# Patient Record
Sex: FEMALE | Race: WHITE | Employment: OTHER | URBAN - METROPOLITAN AREA
[De-identification: names, ages, dates, MRNs, and addresses within clinical notes are randomized per-mention and may not be internally consistent; named-entity substitution may affect disease eponyms.]

---

## 2017-01-05 ENCOUNTER — OFFICE VISIT (OUTPATIENT)
Dept: FAMILY MEDICINE CLINIC | Age: 62
End: 2017-01-05

## 2017-01-05 VITALS
SYSTOLIC BLOOD PRESSURE: 136 MMHG | BODY MASS INDEX: 32.09 KG/M2 | HEIGHT: 65 IN | RESPIRATION RATE: 12 BRPM | DIASTOLIC BLOOD PRESSURE: 80 MMHG | OXYGEN SATURATION: 98 % | HEART RATE: 87 BPM | WEIGHT: 192.6 LBS

## 2017-01-05 DIAGNOSIS — Z13.9 SCREENING: ICD-10-CM

## 2017-01-05 DIAGNOSIS — L98.9 NON-HEALING SKIN LESION: Primary | ICD-10-CM

## 2017-01-05 PROCEDURE — 11100 PR SHAV SKIN LES <5MM FACE,FACIAL: CPT | Performed by: NURSE PRACTITIONER

## 2017-01-05 PROCEDURE — 99213 OFFICE O/P EST LOW 20 MIN: CPT | Performed by: NURSE PRACTITIONER

## 2017-01-05 RX ORDER — VITAMIN B COMPLEX
1 TABLET ORAL DAILY
COMMUNITY
End: 2020-12-09 | Stop reason: ALTCHOICE

## 2017-01-05 ASSESSMENT — ENCOUNTER SYMPTOMS
ABDOMINAL PAIN: 0
COUGH: 0
NAUSEA: 0
SHORTNESS OF BREATH: 0

## 2017-01-05 ASSESSMENT — PATIENT HEALTH QUESTIONNAIRE - PHQ9
SUM OF ALL RESPONSES TO PHQ QUESTIONS 1-9: 0
2. FEELING DOWN, DEPRESSED OR HOPELESS: 0
SUM OF ALL RESPONSES TO PHQ9 QUESTIONS 1 & 2: 0
1. LITTLE INTEREST OR PLEASURE IN DOING THINGS: 0

## 2017-02-24 RX ORDER — MELOXICAM 15 MG/1
TABLET ORAL
Qty: 90 TABLET | Refills: 1 | Status: SHIPPED | OUTPATIENT
Start: 2017-02-24 | End: 2017-08-23 | Stop reason: SDUPTHER

## 2017-08-23 RX ORDER — MELOXICAM 15 MG/1
TABLET ORAL
Qty: 90 TABLET | Refills: 0 | Status: SHIPPED | OUTPATIENT
Start: 2017-08-23 | End: 2017-11-21 | Stop reason: SDUPTHER

## 2017-09-15 ENCOUNTER — TELEPHONE (OUTPATIENT)
Dept: FAMILY MEDICINE CLINIC | Age: 62
End: 2017-09-15

## 2017-09-15 DIAGNOSIS — M15.9 PRIMARY OSTEOARTHRITIS INVOLVING MULTIPLE JOINTS: Primary | ICD-10-CM

## 2017-09-15 RX ORDER — CYCLOBENZAPRINE HCL 10 MG
10 TABLET ORAL NIGHTLY PRN
Qty: 30 TABLET | Refills: 1 | Status: SHIPPED | OUTPATIENT
Start: 2017-09-15 | End: 2018-03-22 | Stop reason: SDUPTHER

## 2017-11-21 RX ORDER — MELOXICAM 15 MG/1
TABLET ORAL
Qty: 90 TABLET | Refills: 1 | Status: SHIPPED | OUTPATIENT
Start: 2017-11-21 | End: 2018-03-22 | Stop reason: SDUPTHER

## 2018-03-22 ENCOUNTER — OFFICE VISIT (OUTPATIENT)
Dept: FAMILY MEDICINE CLINIC | Age: 63
End: 2018-03-22
Payer: COMMERCIAL

## 2018-03-22 VITALS
HEART RATE: 76 BPM | DIASTOLIC BLOOD PRESSURE: 84 MMHG | BODY MASS INDEX: 29.89 KG/M2 | OXYGEN SATURATION: 97 % | HEIGHT: 66 IN | RESPIRATION RATE: 14 BRPM | WEIGHT: 186 LBS | SYSTOLIC BLOOD PRESSURE: 136 MMHG

## 2018-03-22 DIAGNOSIS — M15.9 PRIMARY OSTEOARTHRITIS INVOLVING MULTIPLE JOINTS: ICD-10-CM

## 2018-03-22 DIAGNOSIS — B00.9 HERPES SIMPLEX: Primary | ICD-10-CM

## 2018-03-22 DIAGNOSIS — B00.9 HERPES SIMPLEX: ICD-10-CM

## 2018-03-22 PROCEDURE — 99213 OFFICE O/P EST LOW 20 MIN: CPT | Performed by: FAMILY MEDICINE

## 2018-03-22 RX ORDER — VALACYCLOVIR HYDROCHLORIDE 1 G/1
1000 TABLET, FILM COATED ORAL 3 TIMES DAILY
Qty: 21 TABLET | Refills: 0 | Status: SHIPPED | OUTPATIENT
Start: 2018-03-22 | End: 2018-03-29

## 2018-03-22 RX ORDER — CYCLOBENZAPRINE HCL 10 MG
10 TABLET ORAL NIGHTLY PRN
Qty: 30 TABLET | Refills: 1 | Status: SHIPPED | OUTPATIENT
Start: 2018-03-22 | End: 2019-03-13 | Stop reason: SDUPTHER

## 2018-03-22 RX ORDER — MELOXICAM 15 MG/1
15 TABLET ORAL DAILY
Qty: 90 TABLET | Refills: 1 | Status: SHIPPED | OUTPATIENT
Start: 2018-03-22 | End: 2018-04-17 | Stop reason: SDUPTHER

## 2018-03-22 RX ORDER — MELOXICAM 15 MG/1
15 TABLET ORAL DAILY
Qty: 90 TABLET | Refills: 1 | Status: SHIPPED | OUTPATIENT
Start: 2018-03-22 | End: 2018-03-22 | Stop reason: SDUPTHER

## 2018-03-22 RX ORDER — VALACYCLOVIR HYDROCHLORIDE 1 G/1
1000 TABLET, FILM COATED ORAL 3 TIMES DAILY
Qty: 21 TABLET | Refills: 0 | Status: SHIPPED | OUTPATIENT
Start: 2018-03-22 | End: 2018-03-22 | Stop reason: SDUPTHER

## 2018-03-22 ASSESSMENT — PATIENT HEALTH QUESTIONNAIRE - PHQ9
SUM OF ALL RESPONSES TO PHQ QUESTIONS 1-9: 0
2. FEELING DOWN, DEPRESSED OR HOPELESS: 0
1. LITTLE INTEREST OR PLEASURE IN DOING THINGS: 0
SUM OF ALL RESPONSES TO PHQ9 QUESTIONS 1 & 2: 0

## 2018-03-22 ASSESSMENT — ENCOUNTER SYMPTOMS
GASTROINTESTINAL NEGATIVE: 1
RESPIRATORY NEGATIVE: 1

## 2018-03-22 NOTE — PROGRESS NOTES
Visit Information    Have you changed or started any medications since your last visit including any over-the-counter medicines, vitamins, or herbal medicines? no   Have you stopped taking any of your medications? Is so, why? -  no  Are you having any side effects from any of your medications? - no    Have you seen any other physician or provider since your last visit? yes - Dr Aaron Ybarra office   Have you had any other diagnostic tests since your last visit? yes -  Pelvic, pap,    Have you been seen in the emergency room and/or had an admission in a hospital since we last saw you?  no   Have you had your routine dental cleaning in the past 6 months?  yes - Dr Avendano Felt     Do you have an active MyChart account? If no, what is the barrier?   No:  Pt refused    Patient Care Team:  Robe Brito MD as PCP - General (Family Medicine)    Medical History Review  Past Medical, Family, and Social History reviewed and does not contribute to the patient presenting condition    Health Maintenance   Topic Date Due    Hepatitis C screen  1955    HIV screen  11/27/1970    DTaP/Tdap/Td vaccine (1 - Tdap) 11/27/1974    Lipid screen  11/27/1995    Diabetes screen  11/27/1995    Shingles Vaccine (1 of 2 - 2 Dose Series) 11/27/2005    Breast cancer screen  08/26/2017    Flu vaccine (1) 09/01/2017    Cervical cancer screen  03/17/2018    Colon cancer screen colonoscopy  03/22/2023

## 2018-03-22 NOTE — TELEPHONE ENCOUNTER
Please resend Rxs for Valtrex and Mobic to [facility]. They were sent to [facility].

## 2018-03-22 NOTE — PROGRESS NOTES
Subjective:      Patient ID: Nataliya Alejandro is a 58 y.o. female. HPI:    Chief Complaint   Patient presents with    Annual Exam     med refill  reill for med to tx rash on buttock given by Dr Jm Ortega     Annual eval.      Pt has rash on buttocks for 3 days. Pain with scratching, does itch at times. Has had this before. Hx of OA and LBP that comes and goes. Would like refill of Mobic and Flexeril. Patient Active Problem List   Diagnosis    Sinusitis chronic, frontal    Herpes simplex, skin of buttocks    Osteoarthrosis involving multiple sites, rt knee, fingers/hands    Acute bacterial conjunctivitis     Past Surgical History:   Procedure Laterality Date    CERVIX LESION DESTRUCTION       SECTION      ENDOMETRIAL ABLATION      REFRACTIVE SURGERY       Prior to Admission medications    Medication Sig Start Date End Date Taking? Authorizing Provider   meloxicam (MOBIC) 15 MG tablet TAKE 1 TABLET DAILY 17  Yes Mariajose Washington MD   cyclobenzaprine (FLEXERIL) 10 MG tablet Take 1 tablet by mouth nightly as needed for Muscle spasms 9/15/17 9/15/18 Yes Mariajose Washington MD   B Complex Vitamins (B-COMPLEX/B-12) TABS Take 1 tablet by mouth daily   Yes Historical Provider, MD   ibuprofen (ADVIL;MOTRIN) 400 MG tablet Take 400 mg by mouth as needed for Pain. Yes Historical Provider, MD   Multiple Vitamins-Minerals (MULTIPLE VITAMINS/WOMENS) TABS Take 1 tablet by mouth daily. Yes Historical Provider, MD   OLIVE LEAF PO Take 1 capsule by mouth daily. Yes Historical Provider, MD   oxybutynin (DITROPAN XL) 10 MG CR tablet Take 10 mg by mouth daily. Yes Historical Provider, MD         Review of Systems   Constitutional: Negative. HENT: Negative. Respiratory: Negative. Cardiovascular: Negative. Gastrointestinal: Negative. Musculoskeletal: Positive for arthralgias. Skin: Positive for rash (right buttock). All other systems reviewed and are negative.       Objective:   Physical

## 2018-04-17 DIAGNOSIS — M15.9 PRIMARY OSTEOARTHRITIS INVOLVING MULTIPLE JOINTS: ICD-10-CM

## 2018-04-18 RX ORDER — MELOXICAM 15 MG/1
TABLET ORAL
Qty: 90 TABLET | Refills: 1 | Status: SHIPPED | OUTPATIENT
Start: 2018-04-18 | End: 2019-03-13 | Stop reason: SDUPTHER

## 2019-03-13 ENCOUNTER — OFFICE VISIT (OUTPATIENT)
Dept: FAMILY MEDICINE CLINIC | Age: 64
End: 2019-03-13
Payer: COMMERCIAL

## 2019-03-13 VITALS
RESPIRATION RATE: 20 BRPM | DIASTOLIC BLOOD PRESSURE: 82 MMHG | OXYGEN SATURATION: 96 % | SYSTOLIC BLOOD PRESSURE: 124 MMHG | WEIGHT: 199.7 LBS | BODY MASS INDEX: 32.73 KG/M2 | HEART RATE: 88 BPM

## 2019-03-13 DIAGNOSIS — F51.01 PRIMARY INSOMNIA: Primary | ICD-10-CM

## 2019-03-13 DIAGNOSIS — Z12.31 ENCOUNTER FOR SCREENING MAMMOGRAM FOR BREAST CANCER: ICD-10-CM

## 2019-03-13 DIAGNOSIS — B00.9 HERPES SIMPLEX: ICD-10-CM

## 2019-03-13 DIAGNOSIS — M15.9 PRIMARY OSTEOARTHRITIS INVOLVING MULTIPLE JOINTS: ICD-10-CM

## 2019-03-13 PROCEDURE — 99214 OFFICE O/P EST MOD 30 MIN: CPT | Performed by: FAMILY MEDICINE

## 2019-03-13 RX ORDER — CYCLOBENZAPRINE HCL 10 MG
10 TABLET ORAL NIGHTLY PRN
Qty: 30 TABLET | Refills: 1 | Status: SHIPPED | OUTPATIENT
Start: 2019-03-13 | End: 2021-01-25 | Stop reason: SDUPTHER

## 2019-03-13 RX ORDER — ZOLPIDEM TARTRATE 5 MG/1
5 TABLET ORAL NIGHTLY PRN
Qty: 30 TABLET | Refills: 0 | Status: SHIPPED | OUTPATIENT
Start: 2019-03-13 | End: 2019-05-21 | Stop reason: SDUPTHER

## 2019-03-13 RX ORDER — VALACYCLOVIR HYDROCHLORIDE 1 G/1
1000 TABLET, FILM COATED ORAL 3 TIMES DAILY
Qty: 21 TABLET | Refills: 0 | Status: SHIPPED | OUTPATIENT
Start: 2019-03-13 | End: 2019-03-20

## 2019-03-13 RX ORDER — CALCIUM CARBONATE 500(1250)
500 TABLET ORAL DAILY
COMMUNITY
End: 2020-12-09

## 2019-03-13 RX ORDER — MAGNESIUM 30 MG
30 TABLET ORAL 2 TIMES DAILY
COMMUNITY

## 2019-03-13 RX ORDER — MELOXICAM 15 MG/1
TABLET ORAL
Qty: 90 TABLET | Refills: 1 | Status: SHIPPED | OUTPATIENT
Start: 2019-03-13 | End: 2019-03-30 | Stop reason: SDUPTHER

## 2019-03-13 ASSESSMENT — ENCOUNTER SYMPTOMS
GASTROINTESTINAL NEGATIVE: 1
RESPIRATORY NEGATIVE: 1

## 2019-03-13 ASSESSMENT — PATIENT HEALTH QUESTIONNAIRE - PHQ9
SUM OF ALL RESPONSES TO PHQ QUESTIONS 1-9: 0
SUM OF ALL RESPONSES TO PHQ9 QUESTIONS 1 & 2: 0
2. FEELING DOWN, DEPRESSED OR HOPELESS: 0
SUM OF ALL RESPONSES TO PHQ QUESTIONS 1-9: 0
1. LITTLE INTEREST OR PLEASURE IN DOING THINGS: 0

## 2019-03-30 DIAGNOSIS — M15.9 PRIMARY OSTEOARTHRITIS INVOLVING MULTIPLE JOINTS: ICD-10-CM

## 2019-04-01 RX ORDER — MELOXICAM 15 MG/1
TABLET ORAL
Qty: 90 TABLET | Refills: 1 | Status: SHIPPED | OUTPATIENT
Start: 2019-04-01 | End: 2019-11-21 | Stop reason: SDUPTHER

## 2019-05-06 ENCOUNTER — HOSPITAL ENCOUNTER (OUTPATIENT)
Age: 64
Discharge: HOME | End: 2019-05-06
Payer: COMMERCIAL

## 2019-05-06 LAB
CALCIUM SERPL-MCNC: 9.6 MG/DL (ref 8.5–10.5)
VITAMIN D 25-HYDROXY: 44 NG/ML (ref 30–100)

## 2019-05-06 PROCEDURE — 36415 COLL VENOUS BLD VENIPUNCTURE: CPT

## 2019-05-06 PROCEDURE — 82310 ASSAY OF CALCIUM: CPT

## 2019-05-06 PROCEDURE — 82306 VITAMIN D 25 HYDROXY: CPT

## 2019-05-21 DIAGNOSIS — F51.01 PRIMARY INSOMNIA: ICD-10-CM

## 2019-05-21 RX ORDER — ZOLPIDEM TARTRATE 5 MG/1
5 TABLET ORAL NIGHTLY PRN
Qty: 30 TABLET | Refills: 0 | Status: SHIPPED | OUTPATIENT
Start: 2019-05-21 | End: 2019-08-27 | Stop reason: SDUPTHER

## 2019-05-21 NOTE — TELEPHONE ENCOUNTER
Anita Steve called requesting a refill on the following medications:  Requested Prescriptions     Pending Prescriptions Disp Refills    zolpidem (AMBIEN) 5 MG tablet 30 tablet 0     Sig: Take 1 tablet by mouth nightly as needed for Sleep for up to 30 days.      Pharmacy verified:  [facility]      Date of last visit: 3/13/19   Date of next visit (if applicable):

## 2019-08-27 DIAGNOSIS — F51.01 PRIMARY INSOMNIA: ICD-10-CM

## 2019-08-27 RX ORDER — ZOLPIDEM TARTRATE 5 MG/1
5 TABLET ORAL NIGHTLY PRN
Qty: 30 TABLET | Refills: 0 | Status: SHIPPED | OUTPATIENT
Start: 2019-08-27 | End: 2019-10-28 | Stop reason: SDUPTHER

## 2019-09-16 RX ORDER — SOLIFENACIN SUCCINATE 5 MG/1
TABLET, FILM COATED ORAL
COMMUNITY
Start: 2019-09-09 | End: 2019-10-28

## 2019-10-28 ENCOUNTER — OFFICE VISIT (OUTPATIENT)
Dept: FAMILY MEDICINE CLINIC | Age: 64
End: 2019-10-28
Payer: COMMERCIAL

## 2019-10-28 VITALS
WEIGHT: 197 LBS | HEIGHT: 65 IN | OXYGEN SATURATION: 98 % | DIASTOLIC BLOOD PRESSURE: 68 MMHG | SYSTOLIC BLOOD PRESSURE: 128 MMHG | BODY MASS INDEX: 32.82 KG/M2 | RESPIRATION RATE: 14 BRPM | HEART RATE: 78 BPM

## 2019-10-28 DIAGNOSIS — F51.01 PRIMARY INSOMNIA: Primary | ICD-10-CM

## 2019-10-28 DIAGNOSIS — Z23 NEEDS FLU SHOT: ICD-10-CM

## 2019-10-28 DIAGNOSIS — M15.9 PRIMARY OSTEOARTHRITIS INVOLVING MULTIPLE JOINTS: ICD-10-CM

## 2019-10-28 PROCEDURE — 90686 IIV4 VACC NO PRSV 0.5 ML IM: CPT | Performed by: FAMILY MEDICINE

## 2019-10-28 PROCEDURE — 99214 OFFICE O/P EST MOD 30 MIN: CPT | Performed by: FAMILY MEDICINE

## 2019-10-28 PROCEDURE — 90471 IMMUNIZATION ADMIN: CPT | Performed by: FAMILY MEDICINE

## 2019-10-28 RX ORDER — ZOLPIDEM TARTRATE 5 MG/1
5 TABLET ORAL NIGHTLY PRN
Qty: 90 TABLET | Refills: 1 | Status: SHIPPED | OUTPATIENT
Start: 2019-10-28 | End: 2021-06-09 | Stop reason: SDUPTHER

## 2019-10-28 ASSESSMENT — ENCOUNTER SYMPTOMS
RESPIRATORY NEGATIVE: 1
GASTROINTESTINAL NEGATIVE: 1

## 2019-11-21 DIAGNOSIS — M15.9 PRIMARY OSTEOARTHRITIS INVOLVING MULTIPLE JOINTS: ICD-10-CM

## 2019-11-21 RX ORDER — MELOXICAM 15 MG/1
15 TABLET ORAL DAILY
Qty: 90 TABLET | Refills: 1 | Status: SHIPPED | OUTPATIENT
Start: 2019-11-21 | End: 2019-11-22 | Stop reason: SDUPTHER

## 2019-11-22 DIAGNOSIS — M15.9 PRIMARY OSTEOARTHRITIS INVOLVING MULTIPLE JOINTS: ICD-10-CM

## 2019-11-22 RX ORDER — MELOXICAM 15 MG/1
TABLET ORAL
Qty: 90 TABLET | Refills: 4 | Status: SHIPPED | OUTPATIENT
Start: 2019-11-22 | End: 2021-02-19 | Stop reason: SDUPTHER

## 2020-06-23 ENCOUNTER — VIRTUAL VISIT (OUTPATIENT)
Dept: FAMILY MEDICINE CLINIC | Age: 65
End: 2020-06-23
Payer: COMMERCIAL

## 2020-06-23 PROCEDURE — 99214 OFFICE O/P EST MOD 30 MIN: CPT | Performed by: FAMILY MEDICINE

## 2020-06-23 ASSESSMENT — ENCOUNTER SYMPTOMS
GASTROINTESTINAL NEGATIVE: 1
RESPIRATORY NEGATIVE: 1

## 2020-06-23 NOTE — PROGRESS NOTES
2020    TELEHEALTH EVALUATION -- Audio/Visual (During CFOWZ-21 public health emergency)    HPI:    Rory Lowe (:  1955) has requested an audio/video evaluation for the following concern(s):    Encounter Diagnoses   Name Primary?  Primary osteoarthritis involving multiple joints Yes    Primary insomnia     OAB (overactive bladder)      Patient's arthritis continues to be controlled with her daily dose of meloxicam 15 mg. She is able to walk distances up to a mile without discomfort in her weightbearing joints. She also uses over-the-counter CBD oil both topically and internally which gives her additional pain relief. Both hands do become painful mostly aching, after repetitive use of her fingers like knitting. Her insomnia is an ongoing issue. She had worked up to 10 mg of melatonin without much response so her gynecologist has given her zolpidem 5 mg nightly which is helpful. She continues to have overactive bladder symptoms and this had been treated with Ditropan initially but that was stopped because of dry mouth side effects. She was then switched to [facility] but felt that the dry mouth associated with it was not outweighed by any improved bladder control so she stopped that also. She is going to try and increase her walking distance and work up to 2 miles over the summer. Otherwise she is doing well. The rest of this patient's conditions are stable. Past medical and surgical hx reviewed. No past medical history on file. Past Surgical History:   Procedure Laterality Date    CERVIX LESION DESTRUCTION       SECTION      ENDOMETRIAL ABLATION      REFRACTIVE SURGERY       Portions of this note were completed with a voice recording program.  Efforts were made to edit the dictations but occasionally words are mis-transcribed. Review of Systems   Constitutional: Negative. HENT: Negative. Respiratory: Negative. Cardiovascular: Negative.

## 2020-08-19 ENCOUNTER — TELEPHONE (OUTPATIENT)
Dept: FAMILY MEDICINE CLINIC | Age: 65
End: 2020-08-19

## 2020-08-19 NOTE — TELEPHONE ENCOUNTER
Patient stopped by office and requesting to have B/P checked and compared to her home B/P machine. B/P checked manually 140/80. B/P on machine 154/92. Patient also brought in home readings--to be scanned in chart for your review.   Please advise

## 2020-08-19 NOTE — TELEPHONE ENCOUNTER
She needs to buy a new blood pressure unit. Would recommend her looking for [employer] brand with a arm cuff. With a new unit, have her take 3 readings at least 2 minutes apart than average those readings and record them. However also track pulse rates at the same time. Call those results back into the office or drop off a record of the blood pressures in 2 weeks.

## 2020-09-04 NOTE — TELEPHONE ENCOUNTER
Continue tracking blood pressures at home. At this point, using a low-dose blood pressure medication would be warranted to try to limit the high readings from occurring. See if she is willing to try something.

## 2020-09-04 NOTE — TELEPHONE ENCOUNTER
Patient calls with her BP readings. Most of these were taken in the morning, 1 was at 4 pm, but the highest BP was at 10 pm.    8/19/20 143/79  76  8/20/20 132/80  70  8/21/20 155/82  83  8/22/20 126/81  86  8/23/20 128/82  94  8/24/20 136/90  96  8/25/20 142/81  90  8/26/20 132/76  77  8/27/20 127/81  75  8/29/20 129/86  81  8/30/20 169/86 (10pm) 73  8/31/20 138/75  79      Please advise.

## 2020-09-08 RX ORDER — LISINOPRIL 10 MG/1
10 TABLET ORAL DAILY
Qty: 30 TABLET | Refills: 2 | Status: SHIPPED | OUTPATIENT
Start: 2020-09-08 | End: 2020-09-23

## 2020-09-08 NOTE — TELEPHONE ENCOUNTER
Prescription sent for lisinopril 10 mg daily to her local pharmacy. Obviously keep tracking home blood pressures for us.

## 2020-09-08 NOTE — TELEPHONE ENCOUNTER
The patient returned the call and was updated. She is willing to try a low dose BP medication but is concerned with side effects. Florina Drop to cont to track her BP and call with her readings in 2 weeks. She is to contact the office is she starts experiencing and issues. The patient uses [facility]. Please advise. If no call back she will check with her pharmacy after 2pm today.

## 2020-09-22 ENCOUNTER — TELEPHONE (OUTPATIENT)
Dept: FAMILY MEDICINE CLINIC | Age: 65
End: 2020-09-22

## 2020-09-22 NOTE — TELEPHONE ENCOUNTER
Spoke with pt in-office appointment scheduled for tomorrow 9/23/2020 8:00 am with Nichole Hannah CNP.

## 2020-09-22 NOTE — TELEPHONE ENCOUNTER
Medication, Lisinopril, was filled on 9/9/20.    9/9   8:30 am 135/75   1230  135/86    9/10 11:20am 147/90   1:40pm 139/80    9/12 4:oo pm 158/81    9/13 8:30 am 131/82    9/14 3:30 pm 179/87    9/16 3:00 pm 162/81    9/17 5:30 pm 154/85 (left) 165/86 (right)      She does not feel the Lisinopril is not really working to control her BP. She said there were a couple day she took Tylenol and wondered if that made it go up a little? She is asking if she needs another appointment or what Dr Luis Guzman wants to do? Please advise.     DOLJEANINE  6/23/20  DONJEANINE  12/9/20    [facility]

## 2020-09-23 ENCOUNTER — OFFICE VISIT (OUTPATIENT)
Dept: FAMILY MEDICINE CLINIC | Age: 65
End: 2020-09-23
Payer: COMMERCIAL

## 2020-09-23 VITALS
HEART RATE: 76 BPM | SYSTOLIC BLOOD PRESSURE: 130 MMHG | DIASTOLIC BLOOD PRESSURE: 68 MMHG | BODY MASS INDEX: 31.76 KG/M2 | TEMPERATURE: 97.1 F | WEIGHT: 188.3 LBS | RESPIRATION RATE: 20 BRPM

## 2020-09-23 PROCEDURE — 99213 OFFICE O/P EST LOW 20 MIN: CPT | Performed by: NURSE PRACTITIONER

## 2020-09-23 SDOH — ECONOMIC STABILITY: INCOME INSECURITY: HOW HARD IS IT FOR YOU TO PAY FOR THE VERY BASICS LIKE FOOD, HOUSING, MEDICAL CARE, AND HEATING?: NOT HARD AT ALL

## 2020-09-23 SDOH — ECONOMIC STABILITY: FOOD INSECURITY: WITHIN THE PAST 12 MONTHS, THE FOOD YOU BOUGHT JUST DIDN'T LAST AND YOU DIDN'T HAVE MONEY TO GET MORE.: NEVER TRUE

## 2020-09-23 SDOH — ECONOMIC STABILITY: TRANSPORTATION INSECURITY
IN THE PAST 12 MONTHS, HAS THE LACK OF TRANSPORTATION KEPT YOU FROM MEDICAL APPOINTMENTS OR FROM GETTING MEDICATIONS?: NO

## 2020-09-23 SDOH — ECONOMIC STABILITY: TRANSPORTATION INSECURITY
IN THE PAST 12 MONTHS, HAS LACK OF TRANSPORTATION KEPT YOU FROM MEETINGS, WORK, OR FROM GETTING THINGS NEEDED FOR DAILY LIVING?: NO

## 2020-09-23 SDOH — ECONOMIC STABILITY: FOOD INSECURITY: WITHIN THE PAST 12 MONTHS, YOU WORRIED THAT YOUR FOOD WOULD RUN OUT BEFORE YOU GOT MONEY TO BUY MORE.: NEVER TRUE

## 2020-09-23 ASSESSMENT — PATIENT HEALTH QUESTIONNAIRE - PHQ9
SUM OF ALL RESPONSES TO PHQ QUESTIONS 1-9: 0
1. LITTLE INTEREST OR PLEASURE IN DOING THINGS: 0
2. FEELING DOWN, DEPRESSED OR HOPELESS: 0
SUM OF ALL RESPONSES TO PHQ QUESTIONS 1-9: 0
SUM OF ALL RESPONSES TO PHQ9 QUESTIONS 1 & 2: 0

## 2020-09-23 ASSESSMENT — ENCOUNTER SYMPTOMS
COUGH: 0
NAUSEA: 0
SHORTNESS OF BREATH: 0
ABDOMINAL PAIN: 0

## 2020-09-23 NOTE — PROGRESS NOTES
Chronic Disease Visit Information    BP Readings from Last 3 Encounters:   10/28/19 128/68   03/13/19 124/82   03/22/18 136/84          No results found for: LABA1C, LABMICR, LDLCHOLESTEROL, LDLCALC, HDL, BUN, CREATININE, GLUCOSE         Have you changed or started any medications since your last visit including any over-the-counter medicines, vitamins, or herbal medicines? no   Are you having any side effects from any of your medications? -  no  Have you stopped taking any of your medications? Is so, why? -  yes - pt choice    Have you seen any other physician or provider since your last visit? No  Have you had any other diagnostic tests since your last visit? No  Have you been seen in the emergency room and/or had an admission to a hospital since we last saw you? No  Have you had your annual diabetic retinal (eye) exam? No  Have you had your routine dental cleaning in the past 6 months? yes - 2wks ago    Have you activated your [employer] account? If not, what are your barriers?  No: pt choice     Patient Care Team:  Christiano Felix MD as PCP - General (Family Medicine)  Christiano Felix MD [facility] PCP [facility]         Medical History Review  Past Medical, Family, and Social History reviewed and does contribute to the patient presenting condition    Health Maintenance   Topic Date Due    Potassium monitoring  1955    Creatinine monitoring  1955    Hepatitis C screen  1955    HIV screen  11/27/1970    DTaP/Tdap/Td vaccine (1 - Tdap) 11/27/1974    Lipid screen  11/27/1995    Diabetes screen  11/27/1995    Shingles Vaccine (1 of 2) 11/27/2005    Cervical cancer screen  03/17/2018    Breast cancer screen  03/13/2020    Flu vaccine (1) 09/01/2020    Colon cancer screen colonoscopy  03/26/2028    Hepatitis A vaccine  Aged Out    Hepatitis B vaccine  Aged Out    Hib vaccine  Aged Out    Meningococcal (ACWY) vaccine  Aged Out    Pneumococcal 0-64 years Vaccine  Aged Out
continue to monitor  Stay active, weight loss  Call with update on BP readings        per Klinefelter, APRN - [facility]

## 2020-10-23 ENCOUNTER — TELEPHONE (OUTPATIENT)
Dept: FAMILY MEDICINE CLINIC | Age: 65
End: 2020-10-23

## 2020-10-23 RX ORDER — CEPHALEXIN 500 MG/1
500 CAPSULE ORAL 3 TIMES DAILY
Qty: 15 CAPSULE | Refills: 0 | Status: SHIPPED | OUTPATIENT
Start: 2020-10-23 | End: 2020-10-28

## 2020-10-23 NOTE — Clinical Note
Prescription for Keflex sent to her pharmacy. She is listed as having a penicillin allergy but there is no indication of reaction. Even if she is allergic to penicillin, the chances of a reaction to cephalosporin is less than 5%. In this case, the benefit outweighs the risk. If she should have reaction, she needs to contact the office or be seen in the urgent care.

## 2020-10-23 NOTE — TELEPHONE ENCOUNTER
Patient left voice mail message that she smashed her finger 1 week ago and now believes it is infected. Requesting ATB.   Please advise

## 2020-12-09 ENCOUNTER — OFFICE VISIT (OUTPATIENT)
Dept: FAMILY MEDICINE CLINIC | Age: 65
End: 2020-12-09
Payer: MEDICARE

## 2020-12-09 VITALS
TEMPERATURE: 97.2 F | OXYGEN SATURATION: 98 % | BODY MASS INDEX: 32.39 KG/M2 | HEIGHT: 65 IN | DIASTOLIC BLOOD PRESSURE: 82 MMHG | WEIGHT: 194.4 LBS | SYSTOLIC BLOOD PRESSURE: 138 MMHG | HEART RATE: 84 BPM | RESPIRATION RATE: 16 BRPM

## 2020-12-09 PROCEDURE — 99214 OFFICE O/P EST MOD 30 MIN: CPT | Performed by: FAMILY MEDICINE

## 2020-12-09 ASSESSMENT — ENCOUNTER SYMPTOMS
ALLERGIC/IMMUNOLOGIC NEGATIVE: 1
RESPIRATORY NEGATIVE: 1
GASTROINTESTINAL NEGATIVE: 1

## 2020-12-09 NOTE — PROGRESS NOTES
Subjective:      Patient ID: Cristine Tran is a 72 y.o. female. HPI  Follow up of chronic conditions. Encounter Diagnoses   Name Primary?  Essential hypertension Yes    Primary insomnia     OAB (overactive bladder)     Primary osteoarthritis involving multiple joints     Medication monitoring encounter      HTN is stable without medications. She continues to control her blood pressures by exercise and weight maintenance with dietary changes currently. BP Readings from Last 3 Encounters:   20 138/82   20 130/68   10/28/19 128/68     Her weight is still below what it was a year ago but has gone up from this summer by 6 pounds. She wants to continue controlling this so that she does not have to go back on hypertensive medications. Wt Readings from Last 3 Encounters:   20 194 lb 6.4 oz (88.2 kg)   20 188 lb 4.8 oz (85.4 kg)   10/28/19 197 lb (89.4 kg)   Body mass index is 32.35 kg/m². She continues to use meloxicam as needed but mostly relies on topical CBD oil on joints that hurt. She is not using any over-the-counter supplements either for joint pain. Overactive bladder symptoms are still intermittently present. She was on Vesicare but stopped it due to dry mouth. The fact any other med will have similar side effects was discussed with her so she elects at this point to use no meds. She continues to wear her peripad consequently. The rest of this patient's conditions are stable. Past medical and surgical hx reviewed. No past medical history on file. Past Surgical History:   Procedure Laterality Date    CERVIX LESION DESTRUCTION       SECTION      ENDOMETRIAL ABLATION      REFRACTIVE SURGERY       Portions of this note were completed with a voice recording program.  Efforts were made to edit the dictations but occasionally words are mis-transcribed. Review of Systems   Constitutional: Negative. HENT: Negative. Respiratory: Negative. Cardiovascular: Negative. Gastrointestinal: Negative. Endocrine: Negative. Genitourinary:        See HPI. Musculoskeletal: Positive for arthralgias. Allergic/Immunologic: Negative. Neurological: Negative. Hematological: Negative. Psychiatric/Behavioral: Negative. All other systems reviewed and are negative. Objective:   Physical Exam  Vitals signs and nursing note reviewed. Constitutional:       Appearance: She is obese. HENT:      Head: Normocephalic. Right Ear: Tympanic membrane, ear canal and external ear normal.      Left Ear: Tympanic membrane, ear canal and external ear normal.      Nose: Nose normal.      Mouth/Throat:      Mouth: Mucous membranes are moist.      Pharynx: Oropharynx is clear. Eyes:      Conjunctiva/sclera: Conjunctivae normal.      Pupils: Pupils are equal, round, and reactive to light. Neck:      Vascular: No carotid bruit. Cardiovascular:      Rate and Rhythm: Normal rate and regular rhythm. Pulses: Normal pulses. Heart sounds: Normal heart sounds. No murmur. No gallop. Pulmonary:      Effort: Pulmonary effort is normal.      Breath sounds: Normal breath sounds. Abdominal:      General: Bowel sounds are normal.   Musculoskeletal:      Right knee: She exhibits decreased range of motion. Tenderness found. Medial joint line and lateral joint line tenderness noted. Left knee: She exhibits decreased range of motion. Tenderness found. Medial joint line and lateral joint line tenderness noted. Right lower leg: No edema. Left lower leg: No edema. Lymphadenopathy:      Cervical: No cervical adenopathy. Skin:     General: Skin is warm and dry. Capillary Refill: Capillary refill takes less than 2 seconds. Neurological:      General: No focal deficit present. Mental Status: She is alert and oriented to person, place, and time.    Psychiatric:         Mood and Affect: Mood normal.         Assessment:

## 2020-12-09 NOTE — PROGRESS NOTES
Chronic Disease Visit Information    BP Readings from Last 3 Encounters:   09/23/20 130/68   10/28/19 128/68   03/13/19 124/82          No results found for: LABA1C, LABMICR, LDLCHOLESTEROL, LDLCALC, HDL, BUN, CREATININE, GLUCOSE         Have you changed or started any medications since your last visit including any over-the-counter medicines, vitamins, or herbal medicines? no   Are you having any side effects from any of your medications? -  no  Have you stopped taking any of your medications? Is so, why? -  yes - pt choice    Have you seen any other physician or provider since your last visit? No  Have you had any other diagnostic tests since your last visit? No  Have you been seen in the emergency room and/or had an admission to a hospital since we last saw you? No  Have you had your annual diabetic retinal (eye) exam? No  Have you had your routine dental cleaning in the past 6 months? yes - couple months ago    Have you activated your [employer] account? If not, what are your barriers?  No: pt choice     Patient Care Team:  Sheryle Loron, MD as PCP - General (Family Medicine)  Sheryle Loron, MD as PCP - [address] [place] [address] Dr Vivar Provider         Medical History Review  Past Medical, Family, and Social History reviewed and does contribute to the patient presenting condition    Health Maintenance   Topic Date Due    Hepatitis C screen  1955    HIV screen  11/27/1970    DTaP/Tdap/Td vaccine (1 - Tdap) 11/27/1974    Lipid screen  11/27/1995    Diabetes screen  11/27/1995    Shingles Vaccine (1 of 2) 11/27/2005    Cervical cancer screen  03/17/2018    Breast cancer screen  03/13/2020    Pneumococcal 65+ years Vaccine (1 of 1 - PPSV23) 11/27/2020    Colon cancer screen colonoscopy  03/26/2028    DEXA (modify frequency per FRAX score)  Completed    Flu vaccine  Completed    Hepatitis A vaccine  Aged Out    Hepatitis B vaccine  Aged Out    Hib vaccine  Aged Out    Meningococcal (ACWY) vaccine  Aged Out

## 2021-01-25 DIAGNOSIS — M15.9 PRIMARY OSTEOARTHRITIS INVOLVING MULTIPLE JOINTS: ICD-10-CM

## 2021-01-25 RX ORDER — CYCLOBENZAPRINE HCL 10 MG
10 TABLET ORAL NIGHTLY PRN
Qty: 30 TABLET | Refills: 1 | Status: SHIPPED | OUTPATIENT
Start: 2021-01-25 | End: 2022-07-14 | Stop reason: SDUPTHER

## 2021-01-25 NOTE — TELEPHONE ENCOUNTER
Shaila Yeh called requesting a refill on the following medications:  Requested Prescriptions     Pending Prescriptions Disp Refills    cyclobenzaprine (FLEXERIL) 10 MG tablet 30 tablet 1     Sig: Take 1 tablet by mouth nightly as needed for Muscle spasms     [facility]   . [facility]      Date of last visit: 12/9/20  Date of next visit (if applicable): 2/1/8188

## 2021-01-27 ENCOUNTER — TELEPHONE (OUTPATIENT)
Dept: FAMILY MEDICINE CLINIC | Age: 66
End: 2021-01-27

## 2021-01-27 NOTE — TELEPHONE ENCOUNTER
PA request received from [employer] for Cyclobenzaprine 10mg tablet. PA submitted online and An active PA is already on file with expiration date of 01/25/2022.  Please wait to resubmit request within 60 days of that expiration date to obtain a PA renewal.    Approval scanned in chart from 1/25/2021 for 12/26/2020 to 1/25/2022

## 2021-02-01 ENCOUNTER — TELEPHONE (OUTPATIENT)
Dept: FAMILY MEDICINE CLINIC | Age: 66
End: 2021-02-01

## 2021-02-01 NOTE — TELEPHONE ENCOUNTER
DOLV=12-09-20. Kayy Violet is calling to see if Etter Cranker will prescribe her an antibiotic, she has a pimple/cyst, in groin area, that painful and draining. She is allergic to PCN and Sulfa, and uses RA Onesimo.

## 2021-02-04 ENCOUNTER — HOSPITAL ENCOUNTER (EMERGENCY)
Age: 66
Discharge: HOME | End: 2021-02-04
Payer: MEDICARE

## 2021-02-04 VITALS
DIASTOLIC BLOOD PRESSURE: 78 MMHG | TEMPERATURE: 98.4 F | OXYGEN SATURATION: 96 % | SYSTOLIC BLOOD PRESSURE: 174 MMHG | HEART RATE: 86 BPM | RESPIRATION RATE: 16 BRPM

## 2021-02-04 DIAGNOSIS — L02.91 ABSCESS: Primary | ICD-10-CM

## 2021-02-04 PROCEDURE — 99213 OFFICE O/P EST LOW 20 MIN: CPT

## 2021-02-04 PROCEDURE — 99214 OFFICE O/P EST MOD 30 MIN: CPT | Performed by: NURSE PRACTITIONER

## 2021-02-04 RX ORDER — CEPHALEXIN 500 MG/1
500 CAPSULE ORAL 4 TIMES DAILY
Qty: 28 CAPSULE | Refills: 0 | Status: SHIPPED | OUTPATIENT
Start: 2021-02-04 | End: 2021-02-11 | Stop reason: SDUPTHER

## 2021-02-04 ASSESSMENT — ENCOUNTER SYMPTOMS: ABDOMINAL PAIN: 0

## 2021-02-04 NOTE — ED PROVIDER NOTES
[facility]  Urgent Care Encounter       CHIEF COMPLAINT       Chief Complaint   Patient presents with    Abscess     right upper thigh onset 1 week  busted and drained. wants it  checked out. sore       Nurses Notes reviewed and I agree except as noted in the HPI. HISTORY OF PRESENT ILLNESS   Dotty Herrera is a 72 y.o. female who presents with complaints of a right groin abscess that is spontaneously draining. The history is provided by the patient. Abscess  Location:  Pelvis  Pelvic abscess location:  Groin  Size:  4 x 2 cm  Abscess quality: draining, redness and warmth    Abscess quality: no induration and not painful    Red streaking: no    Duration:  1 week  Progression:  Improving  Chronicity:  New  Context: not diabetes and not skin injury    Relieved by: Warm compresses and topical antibiotics  Worsened by:  Nothing  Ineffective treatments:  Warm compresses and topical antibiotics  Associated symptoms: no fever    Risk factors: no hx of MRSA and no prior abscess        REVIEW OF SYSTEMS     Review of Systems   Constitutional: Negative for chills and fever. Cardiovascular: Negative for chest pain. Gastrointestinal: Negative for abdominal pain. Skin: Positive for wound (abscess). Neurological: Negative for numbness. Hematological: Negative for adenopathy. PAST MEDICAL HISTORY   History reviewed. No pertinent past medical history. SURGICALHISTORY     Patient  has a past surgical history that includes  section; Cervix lesion destruction; Refractive surgery; and Endometrial ablation. CURRENT MEDICATIONS       Previous Medications    CYCLOBENZAPRINE (FLEXERIL) 10 MG TABLET    Take 1 tablet by mouth nightly as needed for Muscle spasms    MAGNESIUM 30 MG TABLET    Take 30 mg by mouth 2 times daily    MELOXICAM (MOBIC) 15 MG TABLET    TAKE 1 TABLET DAILY    OLIVE LEAF PO    Take 1 capsule by mouth daily.        ALLERGIES Patient is is allergic to pcn [penicillins] and sulfa antibiotics. Patients   Immunization History   Administered Date(s) Administered    Influenza, MDCK Quadv, IM, PF (Flucelvax 4 yrs and older) 09/08/2020    Influenza, Quadv, IM, PF (6 mo and older Fluzone, Flulaval, Fluarix, and 3 yrs and older Afluria) 10/18/2017, 10/30/2018, 10/28/2019       FAMILY HISTORY     Patient's family history includes Breast Cancer in her sister; Cancer in her sister; Diabetes in her father; Heart Disease in her father. SOCIAL HISTORY     Patient  reports that she quit smoking about 51 years ago. Her smoking use included cigarettes. She has a 0.03 pack-year smoking history. She has never used smokeless tobacco. She reports that she does not drink alcohol or use drugs. PHYSICAL EXAM     ED TRIAGE VITALS  BP: (!) 174/78, Temp: 98.4 °F (36.9 °C), Pulse: 86, Resp: 16, SpO2: 96 %,Estimated body mass index is 32.35 kg/m² as calculated from the following:    Height as of 12/9/20: 5' 5\" (1.651 m). Weight as of 12/9/20: 194 lb 6.4 oz (88.2 kg). ,No LMP recorded. Patient is postmenopausal.    Physical Exam  Vitals signs and nursing note reviewed. Constitutional:       Appearance: Normal appearance. She is not toxic-appearing. Cardiovascular:      Rate and Rhythm: Normal rate and regular rhythm. Pulses: Normal pulses. Pulmonary:      Effort: Pulmonary effort is normal.   Abdominal:      General: Abdomen is flat. Palpations: Abdomen is soft. Tenderness: There is no abdominal tenderness. Skin:     General: Skin is warm and dry. Findings: Abscess (4x2 cm abscess open spontaneously draining purulent fluid with redness and warmth.), erythema and wound (open abscess right groin depth 0.5 cm) present. Neurological:      Mental Status: She is alert and oriented to person, place, and time.    Psychiatric:         Behavior: Behavior normal.       DIAGNOSTIC RESULTS Labs:No results found for this visit on 02/04/21. IMAGING:  None    EKG:  None    URGENT CARE COURSE:     Vitals:    02/04/21 0929   BP: (!) 174/78   Pulse: 86   Resp: 16   Temp: 98.4 °F (36.9 °C)   TempSrc: Temporal   SpO2: 96%       Medications - No data to display       PROCEDURES:  None    FINAL IMPRESSION      1. Abscess      DISPOSITION/ PLAN   DISPOSITION Decision To Discharge 02/04/2021 09:47:10 AM     Clinical exam revealed a open actively draining right groin abscess approximately 4 x 2 cm with a depth of 0.5 cm. No cellulitis. Will treat with oral antibiotics for 1 week. Instructed to continue dressing changes twice daily, keep wound clean and dry, and continue warm compresses 3 times daily. Instructed to follow-up with PCP in 1 week for wound recheck and verify healing. She should present to the ER if worsens or fails to improve after 3 days if she should develop a fever. Also discussed typical presentation of symptoms following second dose of COVID-19 vaccine. Patient voiced understanding was agreeable with above-mentioned plan. Patient was discharged in stable condition.     PATIENT REFERRED TO:  NEFTALI Nunn CNP  [address] [place] [address]      DISCHARGE MEDICATIONS:  New Prescriptions    CEPHALEXIN (KEFLEX) 500 MG CAPSULE    Take 1 capsule by mouth 4 times daily for 7 days       Discontinued Medications    No medications on file       Current Discharge Medication List          NEFTALI White CNP    (Please note that portions of this note were completed with a voice recognition program. Efforts were made to edit the dictations but occasionally words are mis-transcribed.)            NEFTALI White CNP  02/04/21 9746

## 2021-02-05 ENCOUNTER — TELEPHONE (OUTPATIENT)
Dept: FAMILY MEDICINE CLINIC | Age: 66
End: 2021-02-05

## 2021-02-05 NOTE — LETTER
Rosendo YOO AM OFFADAM MOSHER.DAVIDSON, [address]  Phone: [phone]  Fax: [phone]     February 5, 2021    [address] [facility] [address] [place] [address]    Dear Sharmaine Tello,    This letter is regarding your [facility]'s Urgent Care (UC) visit on 2/4/21. Belkys Cespedes wanted to make sure that you understand your discharge instructions and that you were able to fill any prescriptions that may have been ordered for you. Please contact the office at the above phone number if the ED advised you to follow up with Belkys Cespedes, or if you have any further questions or needs. Also did you know -                              [facility]) practices can often offer you an appointment on the same day that you call. *Evisits are now available for patients for $36 through [employer] for certain conditions:  * Sinus, cold and or cough       * Diarrhea            * Headache  * Heartburn                                * Poison Karina          * Back pain     * Urinary problems                         If you do not have [employer] and are interested, please contact the office and a staff member may assist you or go to [email or web address].     Sincerely,     NEFTALI Hernandez CNP and your [facility]

## 2021-02-11 ENCOUNTER — OFFICE VISIT (OUTPATIENT)
Dept: FAMILY MEDICINE CLINIC | Age: 66
End: 2021-02-11
Payer: MEDICARE

## 2021-02-11 VITALS
DIASTOLIC BLOOD PRESSURE: 88 MMHG | HEART RATE: 92 BPM | BODY MASS INDEX: 31.78 KG/M2 | RESPIRATION RATE: 16 BRPM | SYSTOLIC BLOOD PRESSURE: 138 MMHG | WEIGHT: 191 LBS | TEMPERATURE: 97.7 F

## 2021-02-11 DIAGNOSIS — L02.91 ABSCESS: Primary | ICD-10-CM

## 2021-02-11 PROCEDURE — 99213 OFFICE O/P EST LOW 20 MIN: CPT | Performed by: NURSE PRACTITIONER

## 2021-02-11 RX ORDER — CEPHALEXIN 500 MG/1
500 CAPSULE ORAL 4 TIMES DAILY
Qty: 12 CAPSULE | Refills: 0 | Status: SHIPPED | OUTPATIENT
Start: 2021-02-11 | End: 2021-05-19 | Stop reason: SDUPTHER

## 2021-02-11 ASSESSMENT — PATIENT HEALTH QUESTIONNAIRE - PHQ9
SUM OF ALL RESPONSES TO PHQ QUESTIONS 1-9: 0
SUM OF ALL RESPONSES TO PHQ9 QUESTIONS 1 & 2: 0
2. FEELING DOWN, DEPRESSED OR HOPELESS: 0

## 2021-02-11 NOTE — PROGRESS NOTES
Visit Information    Have you changed or started any medications since your last visit including any over-the-counter medicines, vitamins, or herbal medicines? Yes, see list  Are you having any side effects from any of your medications? -  no  Have you stopped taking any of your medications? Is so, why? -  no    Have you seen any other physician or provider since your last visit? Yes - Records Obtained  Have you had any other diagnostic tests since your last visit? No  Have you been seen in the emergency room and/or had an admission to a hospital since we last saw you? No  Have you had your routine dental cleaning in the past 6 months? Yes, within the past 6mo    Have you activated your [employer] account? If not, what are your barriers?  No: pt choice     Patient Care Team:  NEFTALI Navarro CNP as PCP - General (Family Nurse Practitioner)  NEFTALI Navarro CNP as PCP - [facility]    Medical History Review  Past Medical, Family, and Social History reviewed and does not contribute to the patient presenting condition    Health Maintenance   Topic Date Due    Hepatitis C screen  1955    HIV screen  11/27/1970    DTaP/Tdap/Td vaccine (1 - Tdap) 11/27/1974    Lipid screen  11/27/1995    Diabetes screen  11/27/1995    Shingles Vaccine (1 of 2) 11/27/2005    Cervical cancer screen  03/17/2018    Breast cancer screen  03/13/2020    Pneumococcal 65+ years Vaccine (1 of 1 - PPSV23) 11/27/2020    Annual Wellness Visit (AWV)  12/09/2020    COVID-19 Vaccine (2 of 2 - Moderna series) 02/19/2021    Colon cancer screen colonoscopy  03/26/2028    DEXA (modify frequency per FRAX score)  Completed    Flu vaccine  Completed    Hepatitis A vaccine  Aged Out    Hepatitis B vaccine  Aged Out    Hib vaccine  Aged Out    Meningococcal (ACWY) vaccine  Aged Out

## 2021-02-11 NOTE — PROGRESS NOTES
Irais German (1955) 72 y.o. female here for evaluation of the following chief complaint(s):  Follow-up [facility] UC 2/4/21)      ASSESSMENT/PLAN:  1. Abscess  - cephALEXin (KEFLEX) 500 MG capsule; Take 1 capsule by mouth 4 times daily for 3 days  Dispense: 12 capsule; Refill: 0    MDM:  Continue with Keflex QID x 3 days. Wound secondary healing. Soap and water. Keep area     SUBJECTIVE/OBJECTIVE:      Seen in [facility] on 2/4/21 for groin abscess. Busted and was draining prior to Urgent Care evaluation. Was placed on Keflex QID x 1 week. Presents today for follow up. Well improved. No surrounding redness. Open sore. Review of Systems   Constitutional: Negative. Skin: Positive for wound. Physical Exam  Constitutional:       General: She is not in acute distress. Appearance: She is not ill-appearing. Abdominal:       Neurological:      Mental Status: She is alert. An electronic signature was used to authenticate this note.     --NEFTALI Nunn - CNP

## 2021-02-19 DIAGNOSIS — M15.9 PRIMARY OSTEOARTHRITIS INVOLVING MULTIPLE JOINTS: ICD-10-CM

## 2021-02-19 RX ORDER — MELOXICAM 15 MG/1
TABLET ORAL
Qty: 90 TABLET | Refills: 4 | Status: SHIPPED | OUTPATIENT
Start: 2021-02-19 | End: 2021-06-09 | Stop reason: SDUPTHER

## 2021-05-18 ENCOUNTER — NURSE TRIAGE (OUTPATIENT)
Dept: OTHER | Facility: CLINIC | Age: 66
End: 2021-05-18

## 2021-05-18 NOTE — TELEPHONE ENCOUNTER
Received call from Suzanna at pre-service center [facility]) LEVY ROGERS II.VIERTEL with [employer] Complaint. Brief description of triage: see below    Triage indicates for patient to be seen within the next 24 hours    Care advice provided, patient verbalizes understanding; denies any other questions or concerns; instructed to call back for any new or worsening symptoms. Writer provided warm transfer to Vivian Lorenzana  at [facility] for appointment scheduling. Attention Provider: Thank you for allowing me to participate in the care of your patient. The patient was connected to triage in response to information provided to the [facility]. Please do not respond through this encounter as the response is not directed to a shared pool. Reason for Disposition   [1] Small red streak or spreading redness (<2 inches; 5 cm) AND [2] no fever    Answer Assessment - Initial Assessment Questions  1. APPEARANCE of SORES: \"What do the sores look like? \"      Scab but it is draining yellow fluid    2. NUMBER: \"How many sores are there? \"      One    3. SIZE: \"How big is the largest sore? \"      Pencil end     4. LOCATION: \"Where are the sores located? \"      Right side of belly    5. ONSET: \"When did the sores begin? \"      4 days    6. CAUSE: \"What do you think is causing the sores? \"      Denies    7. OTHER SYMPTOMS: \"Do you have any other symptoms? \" (e.g., fever, new weakness)      Denies    Protocols used: SORES-ADULT-

## 2021-05-19 ENCOUNTER — OFFICE VISIT (OUTPATIENT)
Dept: FAMILY MEDICINE CLINIC | Age: 66
End: 2021-05-19
Payer: MEDICARE

## 2021-05-19 VITALS
WEIGHT: 189.5 LBS | DIASTOLIC BLOOD PRESSURE: 78 MMHG | SYSTOLIC BLOOD PRESSURE: 136 MMHG | BODY MASS INDEX: 31.53 KG/M2 | RESPIRATION RATE: 16 BRPM | HEART RATE: 76 BPM

## 2021-05-19 DIAGNOSIS — Z13.220 SCREENING FOR HYPERLIPIDEMIA: ICD-10-CM

## 2021-05-19 DIAGNOSIS — L02.91 ABSCESS: Primary | ICD-10-CM

## 2021-05-19 DIAGNOSIS — R73.01 IFG (IMPAIRED FASTING GLUCOSE): ICD-10-CM

## 2021-05-19 DIAGNOSIS — I10 ESSENTIAL HYPERTENSION: ICD-10-CM

## 2021-05-19 PROCEDURE — 99213 OFFICE O/P EST LOW 20 MIN: CPT | Performed by: NURSE PRACTITIONER

## 2021-05-19 RX ORDER — CEPHALEXIN 500 MG/1
500 CAPSULE ORAL 4 TIMES DAILY
Qty: 40 CAPSULE | Refills: 0 | Status: SHIPPED | OUTPATIENT
Start: 2021-05-19 | End: 2021-05-29

## 2021-05-19 ASSESSMENT — ENCOUNTER SYMPTOMS: RESPIRATORY NEGATIVE: 1

## 2021-05-19 NOTE — PROGRESS NOTES
Visit Information    Have you changed or started any medications since your last visit including any over-the-counter medicines, vitamins, or herbal medicines? no   Are you having any side effects from any of your medications? -  no  Have you stopped taking any of your medications? Is so, why? -  no    Have you seen any other physician or provider since your last visit? No  Have you had any other diagnostic tests since your last visit? No  Have you been seen in the emergency room and/or had an admission to a hospital since we last saw you? No  Have you had your routine dental cleaning in the past 6 months? yes - 03/21    Have you activated your [employer] account? If not, what are your barriers?  No: declined     Patient Care Team:  NEFTALI Cardenas CNP as PCP - General (Family Nurse Practitioner)  NEFTALI Cardenas CNP as PCP - [facility] Provider    Medical History Review  Past Medical, Family, and Social History reviewed and does not contribute to the patient presenting condition    Health Maintenance   Topic Date Due    Hepatitis C screen  Never done    HIV screen  Never done    DTaP/Tdap/Td vaccine (1 - Tdap) Never done    Lipid screen  Never done    Diabetes screen  Never done    Shingles Vaccine (1 of 2) Never done    Cervical cancer screen  03/17/2018    Breast cancer screen  03/13/2020    Pneumococcal 65+ years Vaccine (1 of 1 - PPSV23) Never done   [employer] Visit (AWV)  Never done    Colon cancer screen colonoscopy  03/26/2028    DEXA (modify frequency per FRAX score)  Completed    Flu vaccine  Completed    COVID-19 Vaccine  Completed    Hepatitis A vaccine  Aged Out    Hepatitis B vaccine  Aged Out    Hib vaccine  Aged Out    Meningococcal (ACWY) vaccine  Aged Out

## 2021-05-25 ENCOUNTER — HOSPITAL ENCOUNTER (OUTPATIENT)
Age: 66
Discharge: HOME | End: 2021-05-25
Payer: MEDICARE

## 2021-05-25 DIAGNOSIS — Z13.220 SCREENING FOR HYPERLIPIDEMIA: ICD-10-CM

## 2021-05-25 DIAGNOSIS — I10 ESSENTIAL HYPERTENSION: ICD-10-CM

## 2021-05-25 DIAGNOSIS — R73.01 IFG (IMPAIRED FASTING GLUCOSE): ICD-10-CM

## 2021-05-25 LAB
ALBUMIN SERPL-MCNC: 4.3 G/DL (ref 3.5–5.1)
ALP BLD-CCNC: 68 U/L (ref 38–126)
ALT SERPL-CCNC: 25 U/L (ref 11–66)
ANION GAP SERPL CALCULATED.3IONS-SCNC: 7 MEQ/L (ref 8–16)
AST SERPL-CCNC: 19 U/L (ref 5–40)
AVERAGE GLUCOSE: 114 MG/DL (ref 70–126)
BILIRUB SERPL-MCNC: 0.3 MG/DL (ref 0.3–1.2)
BUN BLDV-MCNC: 20 MG/DL (ref 7–22)
CALCIUM SERPL-MCNC: 9.6 MG/DL (ref 8.5–10.5)
CHLORIDE BLD-SCNC: 104 MEQ/L (ref 98–111)
CHOLESTEROL, TOTAL: 177 MG/DL (ref 100–199)
CO2: 29 MEQ/L (ref 23–33)
CREAT SERPL-MCNC: 0.7 MG/DL (ref 0.4–1.2)
ERYTHROCYTE [DISTWIDTH] IN BLOOD BY AUTOMATED COUNT: 12.8 % (ref 11.5–14.5)
ERYTHROCYTE [DISTWIDTH] IN BLOOD BY AUTOMATED COUNT: 42.4 FL (ref 35–45)
GFR SERPL CREATININE-BSD FRML MDRD: 84 ML/MIN/1.73M2
GLUCOSE BLD-MCNC: 117 MG/DL (ref 70–108)
HBA1C MFR BLD: 5.8 % (ref 4.4–6.4)
HCT VFR BLD CALC: 43.4 % (ref 37–47)
HDLC SERPL-MCNC: 46 MG/DL
HEMOGLOBIN: 13.9 GM/DL (ref 12–16)
LDL CHOLESTEROL CALCULATED: 104 MG/DL
MCH RBC QN AUTO: 29.4 PG (ref 26–33)
MCHC RBC AUTO-ENTMCNC: 32 GM/DL (ref 32.2–35.5)
MCV RBC AUTO: 91.8 FL (ref 81–99)
PLATELET # BLD: 288 THOU/MM3 (ref 130–400)
PMV BLD AUTO: 11.2 FL (ref 9.4–12.4)
POTASSIUM SERPL-SCNC: 4.6 MEQ/L (ref 3.5–5.2)
RBC # BLD: 4.73 MILL/MM3 (ref 4.2–5.4)
SODIUM BLD-SCNC: 140 MEQ/L (ref 135–145)
TOTAL PROTEIN: 7 G/DL (ref 6.1–8)
TRIGL SERPL-MCNC: 134 MG/DL (ref 0–199)
WBC # BLD: 5.9 THOU/MM3 (ref 4.8–10.8)

## 2021-05-25 PROCEDURE — 80061 LIPID PANEL: CPT

## 2021-05-25 PROCEDURE — 80053 COMPREHEN METABOLIC PANEL: CPT

## 2021-05-25 PROCEDURE — 83036 HEMOGLOBIN GLYCOSYLATED A1C: CPT

## 2021-05-25 PROCEDURE — 36415 COLL VENOUS BLD VENIPUNCTURE: CPT

## 2021-05-25 PROCEDURE — 85027 COMPLETE CBC AUTOMATED: CPT

## 2021-05-26 ENCOUNTER — TELEPHONE (OUTPATIENT)
Dept: FAMILY MEDICINE CLINIC | Age: 66
End: 2021-05-26

## 2021-05-26 NOTE — TELEPHONE ENCOUNTER
Spoke with pt she's been taking Keflex 500 mg for 1 week for a LLQ abscess. Pt starting with severe itching last hs with hives this am.  Pt took a Zyrtec the itching has stopped. Pt asking if you want to change antibiotics. Abscess is doing better still with small amount of redness and draining. [facility]. Pt would like a call back, okay to leave a message if NA.

## 2021-05-26 NOTE — TELEPHONE ENCOUNTER
Continue Zyrtec along with the Keflex to finish out the last couple of days - if itching returns will switch ATB

## 2021-06-09 ENCOUNTER — OFFICE VISIT (OUTPATIENT)
Dept: FAMILY MEDICINE CLINIC | Age: 66
End: 2021-06-09
Payer: MEDICARE

## 2021-06-09 VITALS
DIASTOLIC BLOOD PRESSURE: 72 MMHG | HEIGHT: 64 IN | SYSTOLIC BLOOD PRESSURE: 138 MMHG | RESPIRATION RATE: 20 BRPM | HEART RATE: 72 BPM | BODY MASS INDEX: 32.35 KG/M2 | WEIGHT: 189.5 LBS

## 2021-06-09 DIAGNOSIS — Z00.00 ROUTINE GENERAL MEDICAL EXAMINATION AT A HEALTH CARE FACILITY: Primary | ICD-10-CM

## 2021-06-09 DIAGNOSIS — M15.9 PRIMARY OSTEOARTHRITIS INVOLVING MULTIPLE JOINTS: ICD-10-CM

## 2021-06-09 DIAGNOSIS — F51.01 PRIMARY INSOMNIA: ICD-10-CM

## 2021-06-09 DIAGNOSIS — R73.01 IFG (IMPAIRED FASTING GLUCOSE): ICD-10-CM

## 2021-06-09 DIAGNOSIS — E66.1 CLASS 1 DRUG-INDUCED OBESITY WITHOUT SERIOUS COMORBIDITY WITH BODY MASS INDEX (BMI) OF 32.0 TO 32.9 IN ADULT: ICD-10-CM

## 2021-06-09 PROCEDURE — G0438 PPPS, INITIAL VISIT: HCPCS | Performed by: NURSE PRACTITIONER

## 2021-06-09 RX ORDER — ZOLPIDEM TARTRATE 5 MG/1
5 TABLET ORAL NIGHTLY PRN
Qty: 90 TABLET | Refills: 1 | Status: SHIPPED | OUTPATIENT
Start: 2021-06-09 | End: 2021-12-06

## 2021-06-09 RX ORDER — ZOLPIDEM TARTRATE 5 MG/1
5 TABLET ORAL NIGHTLY PRN
Qty: 90 TABLET | Refills: 1 | Status: SHIPPED | OUTPATIENT
Start: 2021-06-09 | End: 2021-06-09 | Stop reason: SDUPTHER

## 2021-06-09 RX ORDER — MELOXICAM 15 MG/1
TABLET ORAL
Qty: 90 TABLET | Refills: 4 | Status: SHIPPED | OUTPATIENT
Start: 2021-06-09 | End: 2022-07-12

## 2021-06-09 RX ORDER — MELOXICAM 15 MG/1
TABLET ORAL
Qty: 90 TABLET | Refills: 4 | Status: SHIPPED | OUTPATIENT
Start: 2021-06-09 | End: 2021-06-09 | Stop reason: SDUPTHER

## 2021-06-09 ASSESSMENT — PATIENT HEALTH QUESTIONNAIRE - PHQ9
SUM OF ALL RESPONSES TO PHQ9 QUESTIONS 1 & 2: 0
SUM OF ALL RESPONSES TO PHQ QUESTIONS 1-9: 0
2. FEELING DOWN, DEPRESSED OR HOPELESS: 0
1. LITTLE INTEREST OR PLEASURE IN DOING THINGS: 0

## 2021-06-09 ASSESSMENT — LIFESTYLE VARIABLES: HOW OFTEN DO YOU HAVE A DRINK CONTAINING ALCOHOL: 0

## 2021-06-09 ASSESSMENT — ENCOUNTER SYMPTOMS
NAUSEA: 0
SHORTNESS OF BREATH: 0
COUGH: 0
ABDOMINAL PAIN: 0

## 2021-06-09 NOTE — TELEPHONE ENCOUNTER
Pt is asking for her Zolpidem and Meloxicam to be sent to [facility]. Pt says you sent them to Emily Swain at her appt today. Refill if appropriate.

## 2021-06-09 NOTE — PROGRESS NOTES
Subjective:      Patient ID: Brittany Royal 1955 is a 72 y.o. female here for evaluation. Chief Complaint   Patient presents with    Medicare AWV    6 Month Follow-Up       Patient Active Problem List   Diagnosis    Sinusitis chronic, frontal    Herpes simplex, skin of buttocks    Osteoarthrosis involving multiple sites, rt knee, fingers/hands    Acute bacterial conjunctivitis       COLONOSCOPY - 2018  MAMMO - 2019  DEXA - 2019 Osteopenia    Staying active. Use of Mobic 15 mg. PRN use of Ambien. Takes OTC melatonin PRN. Not daily intake. Denies CP, SOB or chest tightness    Vitals:    06/09/21 1046   BP: 138/72   Pulse: 72   Resp: 20        Labs reviewed.      Lab Results   Component Value Date    LABA1C 5.8 05/25/2021     No results found for: EAG    No components found for: CHLPL  Lab Results   Component Value Date    TRIG 134 05/25/2021     Lab Results   Component Value Date    HDL 46 05/25/2021     Lab Results   Component Value Date    LDLCALC 104 05/25/2021     No results found for: LABVLDL      Chemistry        Component Value Date/Time     05/25/2021 0912    K 4.6 05/25/2021 0912     05/25/2021 0912    CO2 29 05/25/2021 0912    BUN 20 05/25/2021 0912    CREATININE 0.7 05/25/2021 0912        Component Value Date/Time    CALCIUM 9.6 05/25/2021 0912    ALKPHOS 68 05/25/2021 0912    AST 19 05/25/2021 0912    ALT 25 05/25/2021 0912    BILITOT 0.3 05/25/2021 0912            No results found for: TSH, L5LNMVY, L4EEAPA, THYROIDAB    Lab Results   Component Value Date    WBC 5.9 05/25/2021    HGB 13.9 05/25/2021    HCT 43.4 05/25/2021    MCV 91.8 05/25/2021     05/25/2021         Health Maintenance   Topic Date Due    Hepatitis C screen  Never done    HIV screen  Never done    DTaP/Tdap/Td vaccine (1 - Tdap) Never done    Shingles Vaccine (1 of 2) Never done    Cervical cancer screen  03/17/2018    Breast cancer screen  03/13/2020    Pneumococcal 65+ years Vaccine (1 of 1 - PPSV23) Never done    Annual Wellness Visit (AWV)  Never done    A1C test (Diabetic or Prediabetic)  05/25/2022    Lipid screen  05/25/2026    Colon cancer screen colonoscopy  03/26/2028    DEXA (modify frequency per FRAX score)  Completed    Flu vaccine  Completed    COVID-19 Vaccine  Completed    Hepatitis A vaccine  Aged Out    Hepatitis B vaccine  Aged Out    Hib vaccine  Aged Out    Meningococcal (ACWY) vaccine  Aged [employer] History   Administered Date(s) Administered    COVID-19, Moderna, PF, 100mcg/0.5mL 01/22/2021, 02/19/2021    Influenza, MDCK Quadv, IM, PF (Flucelvax 4 yrs and older) 09/08/2020    Influenza, Quadv, IM, PF (6 mo and older Fluzone, Flulaval, Fluarix, and 3 yrs and older Afluria) 10/18/2017, 10/30/2018, 10/28/2019       Review of Systems   Constitutional: Negative for chills and fever. HENT: Negative. Respiratory: Negative for cough and shortness of breath. Cardiovascular: Negative for chest pain. Gastrointestinal: Negative for abdominal pain and nausea. Musculoskeletal: Positive for arthralgias. Skin: Negative for rash. Neurological: Negative for dizziness, light-headedness and headaches. Psychiatric/Behavioral: Positive for sleep disturbance. Objective:   Physical Exam  HENT:      Head: Normocephalic. Right Ear: Tympanic membrane normal.      Left Ear: Tympanic membrane normal.      Nose: Nose normal.   Eyes:      Pupils: Pupils are equal, round, and reactive to light. Neck:      Vascular: No carotid bruit. Cardiovascular:      Rate and Rhythm: Normal rate and regular rhythm. Pulses: Normal pulses. Heart sounds: Normal heart sounds. Pulmonary:      Effort: Pulmonary effort is normal.      Breath sounds: Normal breath sounds. Abdominal:      General: Bowel sounds are normal.      Palpations: Abdomen is soft. Musculoskeletal:         General: Tenderness present. Normal range of motion.       Cervical back: Normal range of motion and neck supple. Skin:     General: Skin is warm and dry. Neurological:      Mental Status: She is alert and oriented to person, place, and time. Assessment:       Diagnosis Orders   1. Routine general medical examination at a health care facility     2. Primary insomnia  DISCONTINUED: zolpidem (AMBIEN) 5 MG tablet   3. Primary osteoarthritis involving multiple joints  DISCONTINUED: meloxicam (MOBIC) 15 MG tablet   4. IFG (impaired fasting glucose)     5. Class 1 drug-induced obesity without serious comorbidity with body mass index (BMI) of 32.0 to 32.9 in adult             Plan:      Chronic conditions stable  Labs reviewed  Refills as above  Follow up with Specialists  Immunizations discussed   RTO in 12 months          Current Outpatient Medications   Medication Sig Dispense Refill    cyclobenzaprine (FLEXERIL) 10 MG tablet Take 1 tablet by mouth nightly as needed for Muscle spasms 30 tablet 1    magnesium 30 MG tablet Take 30 mg by mouth 2 times daily      meloxicam (MOBIC) 15 MG tablet TAKE 1 TABLET DAILY 90 tablet 4    zolpidem (AMBIEN) 5 MG tablet Take 1 tablet by mouth nightly as needed for Sleep for up to 180 days. 90 tablet 1     No current facility-administered medications for this visit.

## 2021-06-09 NOTE — PROGRESS NOTES
Chronic Disease Visit Information    BP Readings from Last 3 Encounters:   05/19/21 136/78   02/11/21 138/88   02/04/21 (!) 174/78          Hemoglobin A1C (%)   Date Value   05/25/2021 5.8     LDL Calculated (mg/dL)   Date Value   05/25/2021 104     HDL (mg/dL)   Date Value   05/25/2021 46     BUN (mg/dL)   Date Value   05/25/2021 20     CREATININE (mg/dL)   Date Value   05/25/2021 0.7     Glucose (mg/dL)   Date Value   05/25/2021 117 (H)            Have you changed or started any medications since your last visit including any over-the-counter medicines, vitamins, or herbal medicines? no   Are you having any side effects from any of your medications? -  no  Have you stopped taking any of your medications? Is so, why? -  yes - tx complete    Have you seen any other physician or provider since your last visit? No  Have you had any other diagnostic tests since your last visit? No  Have you been seen in the emergency room and/or had an admission to a hospital since we last saw you? No  Have you had your annual diabetic retinal (eye) exam? No  Have you had your routine dental cleaning in the past 6 months? yes - within the past 6mo    Have you activated your [employer] account? If not, what are your barriers?  No: pt choice     Patient Care Team:  NEFTALI Nair CNP as PCP - General (Family Nurse Practitioner)  NEFTALI Nair CNP as PCP - [facility] Provider         Medical History Review  Past Medical, Family, and Social History reviewed and does contribute to the patient presenting condition    Health Maintenance   Topic Date Due    Hepatitis C screen  Never done    HIV screen  Never done    DTaP/Tdap/Td vaccine (1 - Tdap) Never done    Shingles Vaccine (1 of 2) Never done    Cervical cancer screen  03/17/2018    Breast cancer screen  03/13/2020    Pneumococcal 65+ years Vaccine (1 of 1 - PPSV23) Never done   [employer] Visit (AWV)  Never done    A1C test (Diabetic or Prediabetic) 05/25/2022    Lipid screen  05/25/2026    Colon cancer screen colonoscopy  03/26/2028    DEXA (modify frequency per FRAX score)  Completed    Flu vaccine  Completed    COVID-19 Vaccine  Completed    Hepatitis A vaccine  Aged Out    Hepatitis B vaccine  Aged Out    Hib vaccine  Aged Out    Meningococcal (ACWY) vaccine  Aged Out

## 2021-06-09 NOTE — PATIENT INSTRUCTIONS
You may receive a survey regarding the care you received during your visit. Your input is valuable to us. We encourage you to complete and return your survey. We hope you will choose us in the future for your healthcare needs. Patient Education        Learning About Sleeping Well  What does sleeping well mean? Sleeping well means getting enough sleep. How much sleep is enough varies among people. The number of hours you sleep is not as important as how you feel when you wake up. If you do not feel refreshed, you probably need more sleep. Another sign of not getting enough sleep is feeling tired during the day. The average total nightly sleep time is 7½ to 8 hours. Healthy adults may need a little more or a little less than this. Why is getting enough sleep important? Getting enough quality sleep is a basic part of good health. When your sleep suffers, your mood and your thoughts can suffer too. You may find yourself feeling more grumpy or stressed. Not getting enough sleep also can lead to serious problems, including injury, accidents, anxiety, and depression. What might cause poor sleeping? Many things can cause sleep problems, including:  · Stress. Stress can be caused by fear about a single event, such as giving a speech. Or you may have ongoing stress, such as worry about work or school. · Depression, anxiety, and other mental or emotional conditions. · Changes in your sleep habits or surroundings. This includes changes that happen where you sleep, such as noise, light, or sleeping in a different bed. It also includes changes in your sleep pattern, such as having jet lag or working a late shift. · Health problems, such as pain, breathing problems, and restless legs syndrome. · Lack of regular exercise. How can you help yourself? Here are some tips that may help you sleep more soundly and wake up feeling more refreshed. Your sleeping area   · Use your bedroom only for sleeping and sex.  A bit of light reading may help you fall asleep. But if it doesn't, do your reading elsewhere in the house. Don't watch TV in bed. · Be sure your bed is big enough to stretch out comfortably, especially if you have a sleep partner. · Keep your bedroom quiet, dark, and cool. Use curtains, blinds, or a sleep mask to block out light. To block out noise, use earplugs, soothing music, or a \"white noise\" machine. Your evening and bedtime routine   · Create a relaxing bedtime routine. You might want to take a warm shower or bath, listen to soothing music, or drink a cup of noncaffeinated tea. · Go to bed at the same time every night. And get up at the same time every morning, even if you feel tired. What to avoid   · Limit caffeine (coffee, tea, caffeinated sodas) during the day, and don't have any for at least 4 to 6 hours before bedtime. · Don't drink alcohol before bedtime. Alcohol can cause you to wake up more often during the night. · Don't smoke or use tobacco, especially in the evening. Nicotine can keep you awake. · Don't take naps during the day, especially close to bedtime. · Don't lie in bed awake for too long. If you can't fall asleep, or if you wake up in the middle of the night and can't get back to sleep within 15 minutes or so, get out of bed and go to another room until you feel sleepy. · Don't take medicine right before bed that may keep you awake or make you feel hyper or energized. Your doctor can tell you if your medicine may do this and if you can take it earlier in the day. If you can't sleep   · Imagine yourself in a peaceful, pleasant scene. Focus on the details and feelings of being in a place that is relaxing. · Get up and do a quiet or boring activity until you feel sleepy. · Don't drink any liquids after 6 p.m. if you wake up often because you have to go to the bathroom. Where can you learn more? Go to [email or web address].[email or web address] org and sign in to your [employer] account.

## 2021-06-09 NOTE — PROGRESS NOTES
Medicare Annual Wellness Visit  Name: Carmina Carrasquillo Date: 2021   MRN: [id number] Sex: Female   Age: 72 y.o. Ethnicity: Non-/Non    : 1955 Race: Godfrey Pantoja is here for Medicare AWV and 6 Month Follow-Up    Screenings for behavioral, psychosocial and functional/safety risks, and cognitive dysfunction are all negative except as indicated below. These results, as well as other patient data from the [facility], [place], operated by [facility] form, are documented in Flowsheets linked to this Encounter. Allergies   Allergen Reactions    Pcn [Penicillins]     Sulfa Antibiotics Hives         Prior to Visit Medications    Medication Sig Taking? Authorizing Provider   cyclobenzaprine (FLEXERIL) 10 MG tablet Take 1 tablet by mouth nightly as needed for Muscle spasms Yes NEFTALI Shelton CNP   magnesium 30 MG tablet Take 30 mg by mouth 2 times daily Yes Historical Provider, MD   meloxicam (MOBIC) 15 MG tablet TAKE 1 TABLET DAILY  NEFTALI Shelton CNP   zolpidem (AMBIEN) 5 MG tablet Take 1 tablet by mouth nightly as needed for Sleep for up to 180 days. NEFTALI Shelton CNP       No past medical history on file.     Past Surgical History:   Procedure Laterality Date    CERVIX LESION DESTRUCTION       SECTION      ENDOMETRIAL ABLATION      REFRACTIVE SURGERY           Family History   Problem Relation Age of Onset    Diabetes Father     Heart Disease Father     Breast Cancer Sister     Cancer Sister        CareTeam (Including outside providers/suppliers regularly involved in providing care):   Patient Care Team:  NEFTALI Shelton CNP as PCP - General (Family Nurse Practitioner)  NEFTALI Shelton CNP as PCP - [facility] Empaneled Provider    Wt Readings from Last 3 Encounters:   21 189 lb 8 oz (86 kg)   21 189 lb 8 oz (86 kg)   21 191 lb (86.6 kg)     Vitals:    21 1046   BP: 138/72   Site: Left Upper Arm   Position: Sitting   Cuff Size: Large Adult   Pulse: 72   Resp: 20   Weight: 189 lb 8 oz (86 kg)   Height: 5' 4.25\" (1.632 m)     Body mass index is 32.27 kg/m². Based upon direct observation of the patient, evaluation of cognition reveals recent and remote memory intact. Patient's complete Health Risk Assessment and screening values have been reviewed and are found in Flowsheets. The following problems were reviewed today and where indicated follow up appointments were made and/or referrals ordered. Positive Risk Factor Screenings with Interventions:            General Health and ACP:  General  In general, how would you say your health is?: Excellent  In the past 7 days, have you experienced any of the following?  New or Increased Pain, New or Increased Fatigue, Loneliness, Social Isolation, Stress or Anger?: (!) New or Increased Pain, New or Increased Fatigue  Do you get the social and emotional support that you need?: Yes  Do you have a Living Will?: Yes  Advance Directives     Power of  Living Will ACP-Advance Directive ACP-Power of     Not on File Not on File Not on File Not on File      General Health Risk Interventions:  · Fatigue: regular exercise recommended- 3-5 times per week, 30-45 minutes per session    Health Habits/Nutrition:  Health Habits/Nutrition  Do you exercise for at least 20 minutes 2-3 times per week?: (!) No  Have you lost any weight without trying in the past 3 months?: No  Do you eat only one meal per day?: No  Have you seen the dentist within the past year?: Yes  Body mass index: (!) 32.27  Health Habits/Nutrition Interventions:  · Inadequate physical activity:  educational materials provided to promote increased physical activity    Hearing/Vision:  No exam data present  Hearing/Vision  Do you or your family notice any trouble with your hearing that hasn't been managed with hearing aids?: (!) Yes  Do you have difficulty driving, watching TV, or doing any of your daily activities because of your eyesight?: No  Have you had an eye exam within the past year?: (!) No  Hearing/Vision Interventions:  · Hearing concerns:  patient declines any further evaluation/treatment for hearing issues  · Vision concerns:  patient encouraged to make appointment with his/her eye specialist    Safety:  Safety  Do you have working smoke detectors?: Yes  Have all throw rugs been removed or fastened?: (!) No  Do you have non-slip mats or surfaces in all bathtubs/showers?: Yes  Do all of your stairways have a railing or banister?: (!) No  Are your doorways, halls and stairs free of clutter?: Yes  Do you always fasten your seatbelt when you are in a car?: Yes  Safety Interventions:  · Home safety tips provided     Personalized Preventive Plan   Current Health Maintenance Status  Immunization History   Administered Date(s) Administered    COVID-19, Moderna, PF, 100mcg/0.5mL 01/22/2021, 02/19/2021    Influenza, MDCK Quadv, IM, PF (Flucelvax 4 yrs and older) 09/08/2020    Influenza, Quadv, IM, PF (6 mo and older Fluzone, Flulaval, Fluarix, and 3 yrs and older Afluria) 10/18/2017, 10/30/2018, 10/28/2019        Health Maintenance   Topic Date Due    Hepatitis C screen  Never done    HIV screen  Never done    DTaP/Tdap/Td vaccine (1 - Tdap) Never done    Shingles Vaccine (1 of 2) Never done    Cervical cancer screen  03/17/2018    Breast cancer screen  03/13/2020    Pneumococcal 65+ years Vaccine (1 of 1 - PPSV23) Never done   [employer] Visit (AWV)  Never done    A1C test (Diabetic or Prediabetic)  05/25/2022    Lipid screen  05/25/2026    Colon cancer screen colonoscopy  03/26/2028    DEXA (modify frequency per FRAX score)  Completed    Flu vaccine  Completed    COVID-19 Vaccine  Completed    Hepatitis A vaccine  Aged Out    Hepatitis B vaccine  Aged Out    Hib vaccine  Aged Out    Meningococcal (ACWY) vaccine  Aged Out     Recommendations for [employer] Due: see orders and patient

## 2021-08-09 ENCOUNTER — OFFICE VISIT (OUTPATIENT)
Dept: FAMILY MEDICINE CLINIC | Age: 66
End: 2021-08-09
Payer: MEDICARE

## 2021-08-09 VITALS
HEIGHT: 64 IN | BODY MASS INDEX: 32.74 KG/M2 | RESPIRATION RATE: 16 BRPM | SYSTOLIC BLOOD PRESSURE: 132 MMHG | DIASTOLIC BLOOD PRESSURE: 72 MMHG | WEIGHT: 191.8 LBS | HEART RATE: 72 BPM

## 2021-08-09 DIAGNOSIS — L30.9 DERMATITIS: Primary | ICD-10-CM

## 2021-08-09 PROCEDURE — 99212 OFFICE O/P EST SF 10 MIN: CPT | Performed by: NURSE PRACTITIONER

## 2021-08-09 NOTE — PROGRESS NOTES
Chaka Gonzalez (1955) 72 y.o. female here for evaluation of the following chief complaint(s):      HPI:  Chief Complaint   Patient presents with    Rash     Patient has a rash under her right arm and onto her right breast. Patient states that it started about 1.5 weeks ago. Rash under right arm pit. 4 spots. Minor pain at onset. Denies pain current or itching. Similar spot on breast.     Vitals:    08/09/21 1058   BP: 132/72   Pulse: 72   Resp: 16       Patient Active Problem List   Diagnosis    Sinusitis chronic, frontal    Herpes simplex, skin of buttocks    Osteoarthrosis involving multiple sites, rt knee, fingers/hands    Acute bacterial conjunctivitis       SUBJECTIVE/OBJECTIVE:  Review of Systems   Skin: Positive for rash. Physical Exam          ASSESSMENT/PLAN:   Diagnosis Orders   1. Dermatitis           MDM: Appearance of insect dermatitis? Low likelihood of shingles   OTC Cortisone   RTO PRN      An electronic signature was used to authenticate this note.     --Jairo Darby, NEFTALI - CNP

## 2022-07-12 DIAGNOSIS — M15.9 PRIMARY OSTEOARTHRITIS INVOLVING MULTIPLE JOINTS: ICD-10-CM

## 2022-07-12 RX ORDER — MELOXICAM 15 MG/1
TABLET ORAL
Qty: 90 TABLET | Refills: 3 | Status: SHIPPED | OUTPATIENT
Start: 2022-07-12

## 2022-07-14 DIAGNOSIS — M15.9 PRIMARY OSTEOARTHRITIS INVOLVING MULTIPLE JOINTS: ICD-10-CM

## 2022-07-14 RX ORDER — CYCLOBENZAPRINE HCL 10 MG
10 TABLET ORAL NIGHTLY PRN
Qty: 30 TABLET | Refills: 1 | Status: SHIPPED | OUTPATIENT
Start: 2022-07-14 | End: 2023-07-14

## 2022-12-06 ENCOUNTER — OFFICE VISIT (OUTPATIENT)
Dept: FAMILY MEDICINE CLINIC | Age: 67
End: 2022-12-06
Payer: MEDICARE

## 2022-12-06 VITALS
BODY MASS INDEX: 37.72 KG/M2 | SYSTOLIC BLOOD PRESSURE: 132 MMHG | DIASTOLIC BLOOD PRESSURE: 82 MMHG | OXYGEN SATURATION: 98 % | HEIGHT: 60 IN | WEIGHT: 192.1 LBS | RESPIRATION RATE: 16 BRPM | HEART RATE: 76 BPM

## 2022-12-06 DIAGNOSIS — R00.0 TACHYCARDIA: ICD-10-CM

## 2022-12-06 DIAGNOSIS — R00.2 PALPITATIONS: Primary | ICD-10-CM

## 2022-12-06 PROCEDURE — 99213 OFFICE O/P EST LOW 20 MIN: CPT | Performed by: NURSE PRACTITIONER

## 2022-12-06 PROCEDURE — 1123F ACP DISCUSS/DSCN MKR DOCD: CPT | Performed by: NURSE PRACTITIONER

## 2022-12-06 SDOH — ECONOMIC STABILITY: FOOD INSECURITY: WITHIN THE PAST 12 MONTHS, YOU WORRIED THAT YOUR FOOD WOULD RUN OUT BEFORE YOU GOT MONEY TO BUY MORE.: NEVER TRUE

## 2022-12-06 SDOH — ECONOMIC STABILITY: FOOD INSECURITY: WITHIN THE PAST 12 MONTHS, THE FOOD YOU BOUGHT JUST DIDN'T LAST AND YOU DIDN'T HAVE MONEY TO GET MORE.: NEVER TRUE

## 2022-12-06 ASSESSMENT — ENCOUNTER SYMPTOMS
COUGH: 0
SHORTNESS OF BREATH: 0
NAUSEA: 0
ABDOMINAL PAIN: 0

## 2022-12-06 ASSESSMENT — PATIENT HEALTH QUESTIONNAIRE - PHQ9
SUM OF ALL RESPONSES TO PHQ QUESTIONS 1-9: 0
SUM OF ALL RESPONSES TO PHQ9 QUESTIONS 1 & 2: 0
SUM OF ALL RESPONSES TO PHQ QUESTIONS 1-9: 0
2. FEELING DOWN, DEPRESSED OR HOPELESS: 0
SUM OF ALL RESPONSES TO PHQ QUESTIONS 1-9: 0
SUM OF ALL RESPONSES TO PHQ QUESTIONS 1-9: 0
1. LITTLE INTEREST OR PLEASURE IN DOING THINGS: 0

## 2022-12-06 ASSESSMENT — SOCIAL DETERMINANTS OF HEALTH (SDOH): HOW HARD IS IT FOR YOU TO PAY FOR THE VERY BASICS LIKE FOOD, HOUSING, MEDICAL CARE, AND HEATING?: NOT HARD AT ALL

## 2022-12-06 NOTE — PROGRESS NOTES
Subjective:      Patient ID: Mahogany Lombardo 1955 is a 79 y.o. female here for evaluation. Chief Complaint   Patient presents with    Dizziness    Irregular Heart Beat     Heart rate racing off and on, first occurred one month ago        Onset of 1 month ago of episodes of heart racing. Palpitations. Off and on. Seems to happen in evening. Dizziness/lightheadedness with episodes. Dizziness comes on when she looks up from reading in a sitting position. Boat rocking sensation. 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 Denies CP, SOB or chest tightness with activity. Denies lightheadedness or heart racing with activity. Mom had AFIB. Vitals:    12/06/22 1059   BP: 132/82   Pulse: 76   Resp: 16   SpO2: 98%       Seen OBGYN with labs - Thyroid, BMP wnl    Patient Active Problem List   Diagnosis    Sinusitis chronic, frontal    Herpes simplex, skin of buttocks    Osteoarthrosis involving multiple sites, rt knee, fingers/hands    Acute bacterial conjunctivitis       COLONOSCOPY - 2018  MAMMO - 2019  DEXA - 2019 Osteopenia    Staying active. Use of Mobic 15 mg. PRN use of Ambien. Takes OTC melatonin PRN. Not daily intake. Denies CP, SOB or chest tightness    Vitals:    12/06/22 1059   BP: 132/82   Pulse: 76   Resp: 16   SpO2: 98%        Labs reviewed.      Lab Results   Component Value Date    LABA1C 5.8 05/25/2021     No results found for: EAG    No components found for: CHLPL  Lab Results   Component Value Date    TRIG 134 05/25/2021     Lab Results   Component Value Date    HDL 46 05/25/2021     Lab Results   Component Value Date    LDLCALC 104 05/25/2021     No results found for: LABVLDL      Chemistry        Component Value Date/Time     05/25/2021 0912    K 4.6 05/25/2021 0912     05/25/2021 0912    CO2 29 05/25/2021 0912    BUN 20 05/25/2021 0912    CREATININE 0.7 05/25/2021 0912        Component Value Date/Time    CALCIUM 9.6 05/25/2021 0912    ALKPHOS 68 05/25/2021 0912    AST 19 05/25/2021 0912    ALT 25 05/25/2021 0912    BILITOT 0.3 05/25/2021 0912            No results found for: TSH, G6GGTYP, F1DSMIH, THYROIDAB    Lab Results   Component Value Date    WBC 5.9 05/25/2021    HGB 13.9 05/25/2021    HCT 43.4 05/25/2021    MCV 91.8 05/25/2021     05/25/2021         Health Maintenance   Topic Date Due    Hepatitis C screen  Never done    DTaP/Tdap/Td vaccine (1 - Tdap) Never done    Shingles vaccine (1 of 2) Never done    Pneumococcal 65+ years Vaccine (1 - PCV) Never done    COVID-19 Vaccine (4 - Booster for Moderna series) 12/30/2021    A1C test (Diabetic or Prediabetic)  05/25/2022    Depression Screen  06/09/2022    Annual Wellness Visit (AWV)  06/10/2022    Breast cancer screen  08/31/2022    Lipids  05/25/2026    Colorectal Cancer Screen  03/26/2028    DEXA (modify frequency per FRAX score)  Completed    Flu vaccine  Completed    Hepatitis A vaccine  Aged Out    Hib vaccine  Aged Out    Meningococcal (ACWY) vaccine  Aged Out       Immunization History   Administered Date(s) Administered    COVID-19, MODERNA BLUE border, Primary or Immunocompromised, (age 12y+), IM, 100 mcg/0.5mL 01/22/2021, 02/19/2021    COVID-19, MODERNA Booster BLUE border, (age 18y+), IM, 50mcg/0.25mL 11/04/2021    Influenza, FLUAD, (age 72 y+), Adjuvanted, 0.5mL 10/04/2021, 10/28/2022    Influenza, FLUARIX, FLULAVAL, FLUZONE (age 10 mo+) AND AFLURIA, (age 1 y+), PF, 0.5mL 10/18/2017, 10/30/2018, 10/28/2019    Influenza, FLUCELVAX, (age 10 mo+), MDCK, PF, 0.5mL 09/08/2020       Review of Systems   Constitutional:  Negative for chills and fever. HENT: Negative. Respiratory:  Negative for cough and shortness of breath. Cardiovascular:  Positive for palpitations. Negative for chest pain. Gastrointestinal:  Negative for abdominal pain and nausea. Musculoskeletal:  Positive for arthralgias. Skin:  Negative for rash. Neurological:  Positive for dizziness.  Negative for light-headedness and headaches. Psychiatric/Behavioral:  Positive for sleep disturbance. Objective:   Physical Exam  HENT:      Head: Normocephalic. Right Ear: Tympanic membrane normal.      Left Ear: Tympanic membrane normal.      Nose: Nose normal.   Eyes:      Pupils: Pupils are equal, round, and reactive to light. Neck:      Vascular: No carotid bruit. Cardiovascular:      Rate and Rhythm: Normal rate and regular rhythm. No extrasystoles are present. Pulses: Normal pulses. Heart sounds: Normal heart sounds. No murmur heard. Pulmonary:      Effort: Pulmonary effort is normal.      Breath sounds: Normal breath sounds. Abdominal:      General: Bowel sounds are normal.      Palpations: Abdomen is soft. Musculoskeletal:         General: Tenderness present. Normal range of motion. Cervical back: Normal range of motion and neck supple. Skin:     General: Skin is warm and dry. Neurological:      Mental Status: She is alert and oriented to person, place, and time. Assessment:       Diagnosis Orders   1. Palpitations  Longterm Continuous Cardiac Event Monitor      2. Tachycardia  Longterm Continuous Cardiac Event Monitor                Plan:      Event Monitor 2 week  Recent labs done through OBGYN wnl  Vertigo symptoms discussed when looking up  RTO PRN          Current Outpatient Medications   Medication Sig Dispense Refill    cyclobenzaprine (FLEXERIL) 10 MG tablet Take 1 tablet by mouth nightly as needed for Muscle spasms (Patient taking differently: Take 5 mg by mouth nightly as needed for Muscle spasms) 30 tablet 1    meloxicam (MOBIC) 15 MG tablet TAKE 1 TABLET DAILY 90 tablet 3    magnesium 30 MG tablet Take 30 mg by mouth 2 times daily       No current facility-administered medications for this visit.

## 2022-12-16 ENCOUNTER — HOSPITAL ENCOUNTER (OUTPATIENT)
Dept: NON INVASIVE DIAGNOSTICS | Age: 67
Discharge: HOME | End: 2022-12-16
Payer: MEDICARE

## 2022-12-16 PROCEDURE — 93246 EXT ECG>7D<15D RECORDING: CPT

## 2022-12-16 NOTE — PROCEDURES
The skin was prepped and a 14 day epatch monitor was applied. The patient was instructed on the documentation of symptoms and the purpose of the monitor as well as the things to avoid while wearing the monitor. The patient was instructed to remove and return the monitor on 12/30/2022.   The serial number of the monitor that was applied is [id number]

## 2023-01-05 ENCOUNTER — TELEPHONE (OUTPATIENT)
Dept: FAMILY MEDICINE CLINIC | Age: 68
End: 2023-01-05

## 2023-01-05 DIAGNOSIS — B00.9 HERPES SIMPLEX: ICD-10-CM

## 2023-01-05 RX ORDER — VALACYCLOVIR HYDROCHLORIDE 1 G/1
1000 TABLET, FILM COATED ORAL 3 TIMES DAILY
Qty: 21 TABLET | Refills: 0 | Status: SHIPPED | OUTPATIENT
Start: 2023-01-05 | End: 2023-01-12

## 2023-01-05 RX ORDER — VALACYCLOVIR HYDROCHLORIDE 1 G/1
1000 TABLET, FILM COATED ORAL 3 TIMES DAILY
Qty: 21 TABLET | Refills: 0 | Status: CANCELLED | OUTPATIENT
Start: 2023-01-05 | End: 2023-01-12

## 2023-01-05 NOTE — TELEPHONE ENCOUNTER
Patient calling and requesting refill of medication Dr Socrates Reeves had given her in the past for Herpes Simplex on Buttocks. Requesting Rx to [address] [place] [address].  Will check with pharmacy after 1pm.  Please refill if appropriate

## 2023-01-06 NOTE — PROCEDURES
[address] [id number]                                 EVENT MONITOR    PATIENT NAME: Jaya Rogers                   :        1955  MED REC NO:   [id number]                           ROOM:  ACCOUNT NO:   [de-identified]                           ADMIT DATE: 2022  PROVIDER:     Nina Jose M.D.    TEST TYPE:  Event monitor. CLINICAL HISTORY AND INDICATION:  This is a patient with palpitation. EVENT MONITOR DESCRIPTION:  Event monitor was attached to the patient  between 2022 and 2022. EVENT MONITOR FINDINGS:  Baseline rhythm showed sinus rhythm with  occasional premature atrial beats, a few 3 to 4 beats of nonspecific  narrow complex tachyarrhythmia were noted at times. Otherwise no  sustained arrhythmias. CONCLUSION:  1. Sinus rhythm with PACs. 2.  Nonspecific 3 to 4 beats of tachyarrhythmias were noted at different  times that are rather nonspecific. 3.  Otherwise no sustained arrhythmias.         Vicenta Power M.D.    D: 2023 7:16:04       T: 2023 8:08:21     [email or web address]  Job#: [id number]     Doc#: [id number]    CC:

## 2023-02-07 ENCOUNTER — TELEPHONE (OUTPATIENT)
Dept: FAMILY MEDICINE CLINIC | Age: 68
End: 2023-02-07

## 2023-02-07 NOTE — TELEPHONE ENCOUNTER
CONCLUSION:  1. Sinus rhythm with PACs. 2.  Nonspecific 3 to 4 beats of tachyarrhythmias were noted at different  times that are rather nonspecific. 3.  Otherwise no sustained arrhythmias. Infrequent extra beats. Nothing to cause symptoms or concern for abnormal rhythms.

## 2023-02-07 NOTE — TELEPHONE ENCOUNTER
Patient notified and understanding voiced. Will keep track of how often this occurs. Notices more when she is tired. She will call back if becomes more frequent for appt with provider.

## 2023-03-21 ENCOUNTER — TELEPHONE (OUTPATIENT)
Dept: FAMILY MEDICINE CLINIC | Age: 68
End: 2023-03-21

## 2023-03-21 NOTE — TELEPHONE ENCOUNTER
Pt was notified and voiced understanding. Appt was scheduled with Tasha Crenshaw CNP this Friday to go over her test results and discuss her concerns. She does not wish to find another PCP.

## 2023-03-21 NOTE — TELEPHONE ENCOUNTER
I will not be accepting Anastasiia Flores as a patient at this time, recommend continue care with Scott Aleman for routine care.

## 2023-03-21 NOTE — TELEPHONE ENCOUNTER
Follow up with me in office if she desires to discuss concerns - otherwise recommend seek another PCP

## 2023-03-21 NOTE — TELEPHONE ENCOUNTER
----- Message from [address] sent at 3/21/2023 10:28 AM EDT -----  Subject: Message to Provider    QUESTIONS  Information for Provider? Patient would like to switch her provider from   [facility]uong to Richard Mei. She had to wear a heart monitor and didn't feel   that Phuong took it seriously and there was another issue that she wasn't   happy about so she got a second opinion and was told something different   regarding penicillin. She is requesting an appt this Friday because she is   off work. Please call to schedule with Dr. Cassidy Arshad if possible  ---------------------------------------------------------------------------  --------------  Cesar DUNCAN  [phone]; OK to leave message on voicemail  ---------------------------------------------------------------------------  --------------  SCRIPT ANSWERS  Relationship to Patient?  Self

## 2023-03-24 ENCOUNTER — TELEPHONE (OUTPATIENT)
Dept: CARDIOLOGY CLINIC | Age: 68
End: 2023-03-24

## 2023-03-24 ENCOUNTER — OFFICE VISIT (OUTPATIENT)
Dept: FAMILY MEDICINE CLINIC | Age: 68
End: 2023-03-24
Payer: MEDICARE

## 2023-03-24 VITALS
HEIGHT: 60 IN | WEIGHT: 185.9 LBS | DIASTOLIC BLOOD PRESSURE: 80 MMHG | RESPIRATION RATE: 16 BRPM | OXYGEN SATURATION: 98 % | BODY MASS INDEX: 36.5 KG/M2 | HEART RATE: 68 BPM | SYSTOLIC BLOOD PRESSURE: 142 MMHG

## 2023-03-24 DIAGNOSIS — R00.0 TACHYCARDIA: Primary | ICD-10-CM

## 2023-03-24 DIAGNOSIS — R00.8 GALLOP RHYTHM: ICD-10-CM

## 2023-03-24 DIAGNOSIS — R42 EPISODIC LIGHTHEADEDNESS: ICD-10-CM

## 2023-03-24 DIAGNOSIS — R00.2 PALPITATIONS: ICD-10-CM

## 2023-03-24 PROCEDURE — 99213 OFFICE O/P EST LOW 20 MIN: CPT | Performed by: NURSE PRACTITIONER

## 2023-03-24 PROCEDURE — 1123F ACP DISCUSS/DSCN MKR DOCD: CPT | Performed by: NURSE PRACTITIONER

## 2023-03-24 RX ORDER — ZOLPIDEM TARTRATE 5 MG/1
TABLET ORAL
COMMUNITY
Start: 2021-08-07

## 2023-03-24 RX ORDER — VALACYCLOVIR HYDROCHLORIDE 1 G/1
TABLET, FILM COATED ORAL
COMMUNITY
Start: 2023-01-05

## 2023-03-24 RX ORDER — M-VIT,TX,IRON,MINS/CALC/FOLIC 27MG-0.4MG
1 TABLET ORAL DAILY
COMMUNITY

## 2023-03-24 SDOH — ECONOMIC STABILITY: FOOD INSECURITY: WITHIN THE PAST 12 MONTHS, THE FOOD YOU BOUGHT JUST DIDN'T LAST AND YOU DIDN'T HAVE MONEY TO GET MORE.: NEVER TRUE

## 2023-03-24 SDOH — ECONOMIC STABILITY: FOOD INSECURITY: WITHIN THE PAST 12 MONTHS, YOU WORRIED THAT YOUR FOOD WOULD RUN OUT BEFORE YOU GOT MONEY TO BUY MORE.: NEVER TRUE

## 2023-03-24 SDOH — ECONOMIC STABILITY: HOUSING INSECURITY
IN THE LAST 12 MONTHS, WAS THERE A TIME WHEN YOU DID NOT HAVE A STEADY PLACE TO SLEEP OR SLEPT IN A SHELTER (INCLUDING NOW)?: NO

## 2023-03-24 SDOH — ECONOMIC STABILITY: INCOME INSECURITY: HOW HARD IS IT FOR YOU TO PAY FOR THE VERY BASICS LIKE FOOD, HOUSING, MEDICAL CARE, AND HEATING?: NOT HARD AT ALL

## 2023-03-24 ASSESSMENT — PATIENT HEALTH QUESTIONNAIRE - PHQ9
SUM OF ALL RESPONSES TO PHQ QUESTIONS 1-9: 0
1. LITTLE INTEREST OR PLEASURE IN DOING THINGS: 0
SUM OF ALL RESPONSES TO PHQ QUESTIONS 1-9: 0
SUM OF ALL RESPONSES TO PHQ9 QUESTIONS 1 & 2: 0
2. FEELING DOWN, DEPRESSED OR HOPELESS: 0
SUM OF ALL RESPONSES TO PHQ QUESTIONS 1-9: 0
SUM OF ALL RESPONSES TO PHQ QUESTIONS 1-9: 0

## 2023-03-24 ASSESSMENT — ENCOUNTER SYMPTOMS
SHORTNESS OF BREATH: 0
COUGH: 0
ABDOMINAL PAIN: 0
NAUSEA: 0

## 2023-03-24 NOTE — TELEPHONE ENCOUNTER
LM for pt to return call.  Pt is needing a NP appt.  Diagnosis   R00.0 (ICD-10-CM) - Tachycardia   R00.2 (ICD-10-CM) - Palpitations   R42 (ICD-10-CM) - Episodic lightheadedness

## 2023-03-24 NOTE — PROGRESS NOTES
Negative. Respiratory:  Negative for cough and shortness of breath. Cardiovascular:  Positive for palpitations. Negative for chest pain. Gastrointestinal:  Negative for abdominal pain and nausea. Musculoskeletal:  Positive for arthralgias. Skin:  Negative for rash. Neurological:  Positive for light-headedness. Negative for dizziness and headaches. Psychiatric/Behavioral:  Positive for sleep disturbance. Objective:   Physical Exam  HENT:      Head: Normocephalic. Right Ear: Tympanic membrane normal.      Left Ear: Tympanic membrane normal.      Nose: Nose normal.   Eyes:      Pupils: Pupils are equal, round, and reactive to light. Neck:      Vascular: No carotid bruit. Cardiovascular:      Rate and Rhythm: Normal rate and regular rhythm. No extrasystoles are present. Pulses: Normal pulses. Heart sounds: No murmur heard. Gallop present. S3 sounds present. Pulmonary:      Effort: Pulmonary effort is normal.      Breath sounds: Normal breath sounds. Abdominal:      General: Bowel sounds are normal.      Palpations: Abdomen is soft. Musculoskeletal:         General: Tenderness present. Normal range of motion. Cervical back: Normal range of motion and neck supple. Skin:     General: Skin is warm and dry. Neurological:      Mental Status: She is alert and oriented to person, place, and time. Assessment:       Diagnosis Orders   1. Tachycardia  Echocardiogram complete    Blanche Martinez MD, Cardiology, [facility]      2. Palpitations  Echocardiogram complete    Blanche Martinez MD, Cardiology, [facility]      3. Gallop rhythm  Echocardiogram complete      4.  Episodic lightheadedness  Geronimo Segovia MD, Cardiology, [facility]                Plan:      Holter Reviewed  ECHO  Refer to CARDIO  Stay active  RTO PRN        Current Outpatient Medications   Medication Sig Dispense Refill    Olive Leaf Extract 150 MG CAPS Take by mouth      valACYclovir

## 2023-04-20 ENCOUNTER — OFFICE VISIT (OUTPATIENT)
Dept: CARDIOLOGY CLINIC | Age: 68
End: 2023-04-20
Payer: MEDICARE

## 2023-04-20 VITALS
BODY MASS INDEX: 30.49 KG/M2 | DIASTOLIC BLOOD PRESSURE: 80 MMHG | HEIGHT: 65 IN | WEIGHT: 183 LBS | HEART RATE: 79 BPM | SYSTOLIC BLOOD PRESSURE: 124 MMHG

## 2023-04-20 DIAGNOSIS — R00.0 TACHYCARDIA: Primary | ICD-10-CM

## 2023-04-20 DIAGNOSIS — R00.2 PALPITATIONS: ICD-10-CM

## 2023-04-20 PROCEDURE — 99204 OFFICE O/P NEW MOD 45 MIN: CPT | Performed by: NUCLEAR MEDICINE

## 2023-04-20 PROCEDURE — 1123F ACP DISCUSS/DSCN MKR DOCD: CPT | Performed by: NUCLEAR MEDICINE

## 2023-04-20 PROCEDURE — 93000 ELECTROCARDIOGRAM COMPLETE: CPT | Performed by: NUCLEAR MEDICINE

## 2023-04-20 RX ORDER — METOPROLOL SUCCINATE 25 MG/1
25 TABLET, EXTENDED RELEASE ORAL DAILY
Qty: 90 TABLET | Refills: 3 | Status: SHIPPED | OUTPATIENT
Start: 2023-04-20 | End: 2023-04-21 | Stop reason: SDUPTHER

## 2023-04-20 RX ORDER — METOPROLOL SUCCINATE 25 MG/1
25 TABLET, EXTENDED RELEASE ORAL DAILY
Qty: 30 TABLET | Refills: 0 | Status: SHIPPED | OUTPATIENT
Start: 2023-04-20

## 2023-04-20 ASSESSMENT — ENCOUNTER SYMPTOMS
BACK PAIN: 0
PHOTOPHOBIA: 0
CONSTIPATION: 0
CHEST TIGHTNESS: 0
ANAL BLEEDING: 0
SHORTNESS OF BREATH: 0
BLOOD IN STOOL: 0
RECTAL PAIN: 0
VOMITING: 0
DIARRHEA: 0
ABDOMINAL PAIN: 0
COLOR CHANGE: 0
NAUSEA: 0
ABDOMINAL DISTENTION: 0

## 2023-04-20 NOTE — PROGRESS NOTES
office was done today. I reviewed the ECG. No acute findings    Plan:  No follow-ups on file. As above  Start a beta blockers   Continue risk factor modification and medical management  Thank you for allowing me to participate in the care of your patient. Please don't hesitate to contact me regarding any further issues related to the patient care    Orders Placed:  Orders Placed This Encounter   Procedures    EKG 12 lead     Order Specific Question:   Reason for Exam?     Answer: Other       Medications Prescribed:  No orders of the defined types were placed in this encounter. Discussed use, benefit, and side effects of prescribed medications. All patient questions answered. Pt voicedunderstanding. Instructed to continue current medications, diet and exercise. Continue risk factor modification and medical management. Patient agreed with treatment plan. Follow up as directed.     Electronically signedby Rickie Garza MD on 4/20/2023 at 8:52 AM

## 2023-04-21 RX ORDER — METOPROLOL SUCCINATE 25 MG/1
25 TABLET, EXTENDED RELEASE ORAL DAILY
Qty: 90 TABLET | Refills: 2 | Status: SHIPPED | OUTPATIENT
Start: 2023-04-21

## 2023-07-13 DIAGNOSIS — M15.9 PRIMARY OSTEOARTHRITIS INVOLVING MULTIPLE JOINTS: ICD-10-CM

## 2023-07-13 RX ORDER — MELOXICAM 15 MG/1
TABLET ORAL
Qty: 90 TABLET | Refills: 3 | Status: SHIPPED | OUTPATIENT
Start: 2023-07-13

## 2023-11-02 ENCOUNTER — OFFICE VISIT (OUTPATIENT)
Dept: CARDIOLOGY CLINIC | Age: 68
End: 2023-11-02
Payer: MEDICARE

## 2023-11-02 VITALS
HEART RATE: 84 BPM | SYSTOLIC BLOOD PRESSURE: 136 MMHG | HEIGHT: 65 IN | WEIGHT: 186 LBS | DIASTOLIC BLOOD PRESSURE: 72 MMHG | BODY MASS INDEX: 30.99 KG/M2

## 2023-11-02 DIAGNOSIS — I47.10 SVT (SUPRAVENTRICULAR TACHYCARDIA): Primary | ICD-10-CM

## 2023-11-02 DIAGNOSIS — F51.01 PRIMARY INSOMNIA: ICD-10-CM

## 2023-11-02 PROCEDURE — 1123F ACP DISCUSS/DSCN MKR DOCD: CPT | Performed by: NUCLEAR MEDICINE

## 2023-11-02 PROCEDURE — 99213 OFFICE O/P EST LOW 20 MIN: CPT | Performed by: NUCLEAR MEDICINE

## 2023-11-02 RX ORDER — METOPROLOL SUCCINATE 25 MG/1
25 TABLET, EXTENDED RELEASE ORAL DAILY
Qty: 90 TABLET | Refills: 3 | Status: SHIPPED | OUTPATIENT
Start: 2023-11-02

## 2023-11-02 RX ORDER — ZOLPIDEM TARTRATE 5 MG/1
5 TABLET ORAL NIGHTLY PRN
Qty: 90 TABLET | Refills: 1 | Status: SHIPPED | OUTPATIENT
Start: 2023-11-02 | End: 2024-04-30

## 2023-11-02 NOTE — PROGRESS NOTES
[address] [address]  Dept: [phone]  Dept Fax: [phone]  Loc: [phone]    Visit Date: 2023    José Miguel Parker is a 79 y.o. female who presents todayfor:  Chief Complaint   Patient presents with    Check-Up    Palpitations   Svt is better with metoprolol  Palpitation is better  No chest pain   Some baseline dyspnea  No dizziness  No syncope        HPI:  HPI  No past medical history on file. Past Surgical History:   Procedure Laterality Date    CERVIX LESION DESTRUCTION       SECTION      ENDOMETRIAL ABLATION      REFRACTIVE SURGERY       Family History   Problem Relation Age of Onset    Heart Disease Mother     Diabetes Father     Heart Disease Father     Breast Cancer Sister     Cancer Sister      Social History     Tobacco Use    Smoking status: Former     Packs/day: 0.25     Years: 0.10     Additional pack years: 0.00     Total pack years: 0.03     Types: Cigarettes     Quit date: 1970     Years since quittin.8    Smokeless tobacco: Never   Substance Use Topics    Alcohol use: No      Current Outpatient Medications   Medication Sig Dispense Refill    meloxicam (MOBIC) 15 MG tablet TAKE 1 TABLET DAILY 90 tablet 3    metoprolol succinate (TOPROL XL) 25 MG extended release tablet Take 1 tablet by mouth daily 90 tablet 2    metoprolol succinate (TOPROL XL) 25 MG extended release tablet Take 1 tablet by mouth daily 30 tablet 0    Olive Leaf Extract 150 MG CAPS Take by mouth      valACYclovir (VALTREX) 1 g tablet Take by mouth      zolpidem (AMBIEN) 5 MG tablet Take by mouth. Turmeric (QC TUMERIC COMPLEX PO) Take by mouth      magnesium 30 MG tablet Take 1 tablet by mouth 2 times daily       No current facility-administered medications for this visit.      Allergies   Allergen Reactions    Pcn [Penicillins]     Sulfa Antibiotics Hives     Health Maintenance   Topic Date Due

## 2023-11-02 NOTE — TELEPHONE ENCOUNTER
Pt called office requesting a refill of the Zolpidem 5mg to [facility]. If no call back she will check with the pharmacy after 5pm. Refill if appropriate.

## 2024-04-04 ENCOUNTER — TELEPHONE (OUTPATIENT)
Dept: FAMILY MEDICINE CLINIC | Age: 69
End: 2024-04-04

## 2024-04-04 DIAGNOSIS — M15.9 PRIMARY OSTEOARTHRITIS INVOLVING MULTIPLE JOINTS: ICD-10-CM

## 2024-04-04 RX ORDER — CYCLOBENZAPRINE HCL 10 MG
5-10 TABLET ORAL NIGHTLY PRN
Qty: 30 TABLET | Refills: 1 | Status: SHIPPED | OUTPATIENT
Start: 2024-04-04 | End: 2025-04-04

## 2024-04-04 NOTE — TELEPHONE ENCOUNTER
Pt called office requesting a refill of her Cyclobenzaprine to [facility]. If no call back she will check with them around noon. Refill if appropriate.    Pt scheduled her AWV for next week.

## 2024-04-04 NOTE — TELEPHONE ENCOUNTER
PA request received from pharmacy for Cyclobenzaprine (Flexeril) 10mg #30 for 30.  PA submitted online at [email or web address] and pending review.    Your request has been approved  CaseId:[id number];Status:Approved;Review Type:Prior Auth;Coverage Start Date:03/05/2024;Coverage End Date:04/04/2025;     Pharmacy notified via VM

## 2024-04-10 ENCOUNTER — OFFICE VISIT (OUTPATIENT)
Dept: FAMILY MEDICINE CLINIC | Age: 69
End: 2024-04-10
Payer: MEDICARE

## 2024-04-10 VITALS
SYSTOLIC BLOOD PRESSURE: 136 MMHG | HEART RATE: 76 BPM | BODY MASS INDEX: 32.71 KG/M2 | DIASTOLIC BLOOD PRESSURE: 84 MMHG | RESPIRATION RATE: 16 BRPM | HEIGHT: 64 IN | WEIGHT: 191.6 LBS

## 2024-04-10 DIAGNOSIS — R73.01 IFG (IMPAIRED FASTING GLUCOSE): ICD-10-CM

## 2024-04-10 DIAGNOSIS — Z13.220 SCREENING FOR HYPERLIPIDEMIA: ICD-10-CM

## 2024-04-10 DIAGNOSIS — I47.10 SVT (SUPRAVENTRICULAR TACHYCARDIA) (HCC): ICD-10-CM

## 2024-04-10 DIAGNOSIS — M15.9 PRIMARY OSTEOARTHRITIS INVOLVING MULTIPLE JOINTS: ICD-10-CM

## 2024-04-10 DIAGNOSIS — Z00.00 MEDICARE ANNUAL WELLNESS VISIT, SUBSEQUENT: Primary | ICD-10-CM

## 2024-04-10 DIAGNOSIS — F51.01 PRIMARY INSOMNIA: ICD-10-CM

## 2024-04-10 PROCEDURE — 1123F ACP DISCUSS/DSCN MKR DOCD: CPT | Performed by: NURSE PRACTITIONER

## 2024-04-10 PROCEDURE — G0439 PPPS, SUBSEQ VISIT: HCPCS | Performed by: NURSE PRACTITIONER

## 2024-04-10 SDOH — ECONOMIC STABILITY: FOOD INSECURITY: WITHIN THE PAST 12 MONTHS, YOU WORRIED THAT YOUR FOOD WOULD RUN OUT BEFORE YOU GOT MONEY TO BUY MORE.: NEVER TRUE

## 2024-04-10 SDOH — ECONOMIC STABILITY: FOOD INSECURITY: WITHIN THE PAST 12 MONTHS, THE FOOD YOU BOUGHT JUST DIDN'T LAST AND YOU DIDN'T HAVE MONEY TO GET MORE.: NEVER TRUE

## 2024-04-10 SDOH — ECONOMIC STABILITY: INCOME INSECURITY: HOW HARD IS IT FOR YOU TO PAY FOR THE VERY BASICS LIKE FOOD, HOUSING, MEDICAL CARE, AND HEATING?: NOT HARD AT ALL

## 2024-04-10 ASSESSMENT — PATIENT HEALTH QUESTIONNAIRE - PHQ9
SUM OF ALL RESPONSES TO PHQ QUESTIONS 1-9: 0
2. FEELING DOWN, DEPRESSED OR HOPELESS: NOT AT ALL
SUM OF ALL RESPONSES TO PHQ9 QUESTIONS 1 & 2: 0
1. LITTLE INTEREST OR PLEASURE IN DOING THINGS: NOT AT ALL
SUM OF ALL RESPONSES TO PHQ QUESTIONS 1-9: 0

## 2024-04-10 ASSESSMENT — ENCOUNTER SYMPTOMS
NAUSEA: 0
ABDOMINAL PAIN: 0
COUGH: 0
SHORTNESS OF BREATH: 0

## 2024-04-10 ASSESSMENT — LIFESTYLE VARIABLES
HOW OFTEN DO YOU HAVE A DRINK CONTAINING ALCOHOL: NEVER
HOW MANY STANDARD DRINKS CONTAINING ALCOHOL DO YOU HAVE ON A TYPICAL DAY: PATIENT DOES NOT DRINK

## 2024-04-10 NOTE — PROGRESS NOTES
Medicare Annual Wellness Visit    Linda Garcia is here for Medicare AWV    Assessment & Plan   Medicare annual wellness visit, subsequent    Recommendations for Preventive Services Due: see orders and patient instructions/AVS.  Recommended screening schedule for the next 5-10 years is provided to the patient in written form: see Patient Instructions/AVS.     No follow-ups on file.     Subjective       Patient's complete Health Risk Assessment and screening values have been reviewed and are found in Flowsheets. The following problems were reviewed today and where indicated follow up appointments were made and/or referrals ordered.    Positive Risk Factor Screenings with Interventions:       Cognitive:   Clock Drawing Test (CDT): Normal  Words recalled: 0 Words Recalled  Total Score: (!) 2  Total Score Interpretation: Abnormal Mini-Cog  Interventions:  Patient declines any further evaluation or treatment           General HRA Questions:  Select all that apply: (!) New or Increased Pain, New or Increased Fatigue    Pain Interventions:  Patient declined any further interventions or treatment    Fatigue Interventions:  Patient declined any further interventions or treatment      Activity, Diet, and Weight:  On average, how many days per week do you engage in moderate to strenuous exercise (like a brisk walk)?: 0 days  On average, how many minutes do you engage in exercise at this level?: 0 min    Do you eat balanced/healthy meals regularly?: (!) No    Body mass index is 32.89 kg/m². (!) Abnormal      Inactivity Interventions:  Recommendations: patient agrees to exercise for at least 150 minutes/week  Do you eat balanced/healthy meals regularly Interventions:  low carbohydrate diet  Obesity Interventions:  low carbohydrate diet             Hearing Screen:  Do you or your family notice any trouble with your hearing that hasn't been managed with hearing aids?: (!) Yes    Interventions:  Patient declines any further 
zolpidem (AMBIEN) 5 MG tablet Take 1 tablet by mouth nightly as needed for Sleep for up to 180 days. 90 tablet 1    meloxicam (MOBIC) 15 MG tablet TAKE 1 TABLET DAILY 90 tablet 3    metoprolol succinate (TOPROL XL) 25 MG extended release tablet Take 1 tablet by mouth daily 90 tablet 2    Olive Leaf Extract 150 MG CAPS Take by mouth      valACYclovir (VALTREX) 1 g tablet Take by mouth      Turmeric (QC TUMERIC COMPLEX PO) Take by mouth      magnesium 30 MG tablet Take 1 tablet by mouth 2 times daily       No current facility-administered medications for this visit.

## 2024-04-22 LAB
ESTIMATED AVERAGE GLUCOSE: 123 MG/DL
HBA1C MFR BLD: 5.9 % (ref 4.2–5.6)

## 2024-07-08 DIAGNOSIS — M15.9 PRIMARY OSTEOARTHRITIS INVOLVING MULTIPLE JOINTS: ICD-10-CM

## 2024-07-08 RX ORDER — MELOXICAM 15 MG/1
TABLET ORAL
Qty: 90 TABLET | Refills: 3 | Status: SHIPPED | OUTPATIENT
Start: 2024-07-08

## 2024-09-19 DIAGNOSIS — F51.01 PRIMARY INSOMNIA: ICD-10-CM

## 2024-09-19 RX ORDER — ZOLPIDEM TARTRATE 5 MG/1
5 TABLET ORAL NIGHTLY PRN
Qty: 90 TABLET | Refills: 1 | Status: SHIPPED | OUTPATIENT
Start: 2024-09-19 | End: 2025-03-18

## 2024-10-28 ENCOUNTER — TELEPHONE (OUTPATIENT)
Dept: CARDIOLOGY CLINIC | Age: 69
End: 2024-10-28

## 2024-10-28 NOTE — TELEPHONE ENCOUNTER
LOV:11/2/23    NOV:11/14/24  Patient called in complaining of feeling faint X 1 week, with SOB after taking metoprolol 25 MG dayana with walking. Patient say her watch is alerting her that her heart is out of rhythm x 1 week. Patient is requesting a sooner appt with Dr. Joseph. Patient was offered an appt with one of the PA's, she refused. Please contact patient to schedule.

## 2024-10-29 NOTE — TELEPHONE ENCOUNTER
Spoke with patient, discussed. She is currently scheduled to see Dr Joseph 11/14. Nothing sooner. Does not wish to see anyone else. She is okay with keeping appointment as scheduled for now. Printed to follow for cancellation.  She hasn't been monitoring her BP.   HR  per watch.  Instructed to keep BID HR and BP log leading up to appointment. Pt expressed understanding.

## 2024-11-14 ENCOUNTER — OFFICE VISIT (OUTPATIENT)
Dept: CARDIOLOGY CLINIC | Age: 69
End: 2024-11-14

## 2024-11-14 VITALS
BODY MASS INDEX: 32.95 KG/M2 | HEIGHT: 64 IN | HEART RATE: 81 BPM | WEIGHT: 193 LBS | SYSTOLIC BLOOD PRESSURE: 160 MMHG | DIASTOLIC BLOOD PRESSURE: 82 MMHG

## 2024-11-14 DIAGNOSIS — I10 PRIMARY HYPERTENSION: ICD-10-CM

## 2024-11-14 DIAGNOSIS — R00.2 PALPITATIONS: ICD-10-CM

## 2024-11-14 DIAGNOSIS — R00.0 TACHYCARDIA: ICD-10-CM

## 2024-11-14 DIAGNOSIS — I47.10 SVT (SUPRAVENTRICULAR TACHYCARDIA) (HCC): Primary | ICD-10-CM

## 2024-11-14 RX ORDER — METOPROLOL SUCCINATE 50 MG/1
50 TABLET, EXTENDED RELEASE ORAL DAILY
Qty: 90 TABLET | Refills: 3 | Status: SHIPPED | OUTPATIENT
Start: 2024-11-14 | End: 2024-11-15 | Stop reason: SDUPTHER

## 2024-11-14 NOTE — PROGRESS NOTES
[place] [facility]  [facility] CARDIOLOGY  [address]  [address]  [facility] [address]  Dept: [phone]  Dept Fax: [phone]  Loc: [phone]    Visit Date: 2024    Linda Garcia is a 68 y.o. female who presents todayfor:  Chief Complaint   Patient presents with    Hypertension    Palpitations   Having more palpitation   More than usual   Associated dizziness  No syncope   No triggers   Known SVT   Also running higher BP  Some baseline dyspnea  Some more than usual   Some risk for CAD   No known CAD       HPI:  HPI  No past medical history on file.   Past Surgical History:   Procedure Laterality Date    CERVIX LESION DESTRUCTION       SECTION      ENDOMETRIAL ABLATION      REFRACTIVE SURGERY       Family History   Problem Relation Age of Onset    Heart Disease Mother     Diabetes Father     Heart Disease Father     Breast Cancer Sister     Cancer Sister      Social History     Tobacco Use    Smoking status: Former     Current packs/day: 0.00     Average packs/day: 0.3 packs/day for 0.1 years     Types: Cigarettes     Start date: 1969     Quit date: 1970     Years since quittin.9     Passive exposure: Never    Smokeless tobacco: Never   Substance Use Topics    Alcohol use: No      Current Outpatient Medications   Medication Sig Dispense Refill    zolpidem (AMBIEN) 5 MG tablet Take 1 tablet by mouth nightly as needed for Sleep for up to 180 days. 90 tablet 1    meloxicam (MOBIC) 15 MG tablet TAKE 1 TABLET DAILY 90 tablet 3    cyclobenzaprine (FLEXERIL) 10 MG tablet Take 0.5-1 tablets by mouth nightly as needed for Muscle spasms 30 tablet 1    metoprolol succinate (TOPROL XL) 25 MG extended release tablet Take 1 tablet by mouth daily 90 tablet 2    Olive Leaf Extract 150 MG CAPS Take by mouth      valACYclovir (VALTREX) 1 g tablet Take by mouth      magnesium 30 MG tablet Take 1 tablet by mouth 2 times daily       No current facility-administered

## 2024-11-15 RX ORDER — METOPROLOL SUCCINATE 50 MG/1
50 TABLET, EXTENDED RELEASE ORAL DAILY
Qty: 90 TABLET | Refills: 3 | Status: SHIPPED | OUTPATIENT
Start: 2024-11-15

## 2024-11-15 NOTE — TELEPHONE ENCOUNTER
Pt is calling and wanted the metroprolol sent to [facility] and not to [facility].  She would like the prescription sent to [facility] for a 90 day supplies.  Please advise pt at [phone]

## 2024-11-18 ENCOUNTER — HOSPITAL ENCOUNTER (OUTPATIENT)
Age: 69
Discharge: HOME | End: 2024-11-20
Attending: NUCLEAR MEDICINE
Payer: MEDICARE

## 2024-11-18 DIAGNOSIS — I10 PRIMARY HYPERTENSION: ICD-10-CM

## 2024-11-18 DIAGNOSIS — I47.10 SVT (SUPRAVENTRICULAR TACHYCARDIA) (HCC): ICD-10-CM

## 2024-11-18 DIAGNOSIS — R00.2 PALPITATIONS: ICD-10-CM

## 2024-11-18 DIAGNOSIS — R00.0 TACHYCARDIA: ICD-10-CM

## 2024-11-18 PROCEDURE — 93270 REMOTE 30 DAY ECG REV/REPORT: CPT

## 2025-03-17 ENCOUNTER — TELEPHONE (OUTPATIENT)
Dept: CARDIOLOGY CLINIC | Age: 70
End: 2025-03-17

## 2025-06-25 NOTE — PROGRESS NOTES
[facility]  [facility] CARDIOLOGY  [address]  [address]  [facility] [facility] [address]  Dept: [phone]  Dept Fax: [phone]  Loc: [phone]    Visit Date: 2025    Linda Garcia is a 69 y.o. female who presents todayfor:  Chief Complaint   Patient presents with    Follow-up     Bp issues     Cardiologist: duncan Cardoza of HTN, palpitations/SVT, dizziness,     HPI: f/u for BP concerns   She has been having episodes of near syncope/dizziness with full feelings in her head and some tingling followed by higher BP readings as high as 170s/110s. She has gotten these symptoms more often lately. She has also had some palptiations during these episodes. She has tried taking an extra metoprolol at times. She does drink caffeine with coffree, tea, pop intermittently.   Past Surgical History:   Procedure Laterality Date    CERVIX LESION DESTRUCTION       SECTION      ENDOMETRIAL ABLATION      REFRACTIVE SURGERY       Family History   Problem Relation Age of Onset    Heart Disease Mother     Diabetes Father     Heart Disease Father     Breast Cancer Sister     Cancer Sister      Social History     Tobacco Use    Smoking status: Former     Current packs/day: 0.00     Average packs/day: 0.3 packs/day for 0.1 years     Types: Cigarettes     Start date: 1969     Quit date: 1970     Years since quittin.5     Passive exposure: Never    Smokeless tobacco: Never   Substance Use Topics    Alcohol use: No      Current Outpatient Medications   Medication Sig Dispense Refill    metoprolol succinate (TOPROL XL) 50 MG extended release tablet Take 1 tablet by mouth daily 90 tablet 3    meloxicam (MOBIC) 15 MG tablet TAKE 1 TABLET DAILY 90 tablet 3    Olive Leaf Extract 150 MG CAPS Take by mouth      valACYclovir (VALTREX) 1 g tablet Take by mouth as needed      magnesium 30 MG tablet Take 400 mg by mouth daily       No current facility-administered medications for this

## 2025-06-26 ENCOUNTER — OFFICE VISIT (OUTPATIENT)
Dept: CARDIOLOGY CLINIC | Age: 70
End: 2025-06-26
Payer: MEDICARE

## 2025-06-26 VITALS
WEIGHT: 193.2 LBS | DIASTOLIC BLOOD PRESSURE: 75 MMHG | HEIGHT: 65 IN | HEART RATE: 68 BPM | BODY MASS INDEX: 32.19 KG/M2 | SYSTOLIC BLOOD PRESSURE: 144 MMHG

## 2025-06-26 DIAGNOSIS — I10 ESSENTIAL HYPERTENSION: Primary | ICD-10-CM

## 2025-06-26 DIAGNOSIS — R00.2 PALPITATIONS: ICD-10-CM

## 2025-06-26 PROCEDURE — 3078F DIAST BP <80 MM HG: CPT | Performed by: STUDENT IN AN ORGANIZED HEALTH CARE EDUCATION/TRAINING PROGRAM

## 2025-06-26 PROCEDURE — 1123F ACP DISCUSS/DSCN MKR DOCD: CPT | Performed by: STUDENT IN AN ORGANIZED HEALTH CARE EDUCATION/TRAINING PROGRAM

## 2025-06-26 PROCEDURE — 99214 OFFICE O/P EST MOD 30 MIN: CPT | Performed by: STUDENT IN AN ORGANIZED HEALTH CARE EDUCATION/TRAINING PROGRAM

## 2025-06-26 PROCEDURE — 3077F SYST BP >= 140 MM HG: CPT | Performed by: STUDENT IN AN ORGANIZED HEALTH CARE EDUCATION/TRAINING PROGRAM

## 2025-06-26 RX ORDER — METOPROLOL SUCCINATE 50 MG/1
50 TABLET, EXTENDED RELEASE ORAL 2 TIMES DAILY
Qty: 180 TABLET | Refills: 3 | Status: SHIPPED | OUTPATIENT
Start: 2025-06-26

## 2025-06-26 RX ORDER — METOPROLOL SUCCINATE 50 MG/1
50 TABLET, EXTENDED RELEASE ORAL 2 TIMES DAILY
COMMUNITY
End: 2025-06-26 | Stop reason: SDUPTHER

## 2025-07-02 DIAGNOSIS — M15.0 PRIMARY OSTEOARTHRITIS INVOLVING MULTIPLE JOINTS: ICD-10-CM

## 2025-07-02 RX ORDER — MELOXICAM 15 MG/1
15 TABLET ORAL DAILY
Qty: 90 TABLET | Refills: 3 | Status: SHIPPED | OUTPATIENT
Start: 2025-07-02